# Patient Record
Sex: MALE | Race: WHITE | NOT HISPANIC OR LATINO | Employment: OTHER | URBAN - METROPOLITAN AREA
[De-identification: names, ages, dates, MRNs, and addresses within clinical notes are randomized per-mention and may not be internally consistent; named-entity substitution may affect disease eponyms.]

---

## 2020-10-17 ENCOUNTER — HOSPITAL ENCOUNTER (INPATIENT)
Facility: HOSPITAL | Age: 85
LOS: 2 days | Discharge: HOME HEALTH | DRG: 087 | End: 2020-10-19
Attending: EMERGENCY MEDICINE | Admitting: ANESTHESIOLOGY
Payer: MEDICARE

## 2020-10-17 DIAGNOSIS — I60.9 SUBARACHNOID HEMORRHAGE: ICD-10-CM

## 2020-10-17 DIAGNOSIS — S06.6X0A SUBARACHNOID HEMORRHAGE FOLLOWING INJURY, NO LOSS OF CONSCIOUSNESS, INITIAL ENCOUNTER: ICD-10-CM

## 2020-10-17 DIAGNOSIS — S06.6XAA: Primary | ICD-10-CM

## 2020-10-17 DIAGNOSIS — S09.93XA: ICD-10-CM

## 2020-10-17 DIAGNOSIS — I25.10 CORONARY ARTERY DISEASE INVOLVING NATIVE CORONARY ARTERY OF NATIVE HEART WITHOUT ANGINA PECTORIS: ICD-10-CM

## 2020-10-17 DIAGNOSIS — I10 ESSENTIAL HYPERTENSION: ICD-10-CM

## 2020-10-17 DIAGNOSIS — E11.9 TYPE 2 DIABETES MELLITUS WITHOUT COMPLICATION, WITHOUT LONG-TERM CURRENT USE OF INSULIN: ICD-10-CM
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
## 2020-10-17 LAB
ABO + RH BLD: NORMAL
ALBUMIN SERPL BCP-MCNC: 3.8 G/DL (ref 3.5–5.2)
ALP SERPL-CCNC: 183 U/L (ref 55–135)
ALT SERPL W/O P-5'-P-CCNC: 21 U/L (ref 10–44)
ANION GAP SERPL CALC-SCNC: 10 MMOL/L (ref 8–16)
AST SERPL-CCNC: 28 U/L (ref 10–40)
BASOPHILS # BLD AUTO: 0.03 K/UL (ref 0–0.2)
BASOPHILS NFR BLD: 0.3 % (ref 0–1.9)
BILIRUB SERPL-MCNC: 1.1 MG/DL (ref 0.1–1)
BILIRUB UR QL STRIP: NEGATIVE
BLD GP AB SCN CELLS X3 SERPL QL: NORMAL
BLD GP AB SCN CELLS X3 SERPL QL: NORMAL
BUN SERPL-MCNC: 24 MG/DL (ref 8–23)
CALCIUM SERPL-MCNC: 8.9 MG/DL (ref 8.7–10.5)
CHLORIDE SERPL-SCNC: 103 MMOL/L (ref 95–110)
CLARITY UR REFRACT.AUTO: CLEAR
CO2 SERPL-SCNC: 24 MMOL/L (ref 23–29)
COLOR UR AUTO: YELLOW
CREAT SERPL-MCNC: 1.1 MG/DL (ref 0.5–1.4)
CTP QC/QA: YES
DIFFERENTIAL METHOD: ABNORMAL
EOSINOPHIL # BLD AUTO: 0.1 K/UL (ref 0–0.5)
EOSINOPHIL NFR BLD: 0.7 % (ref 0–8)
ERYTHROCYTE [DISTWIDTH] IN BLOOD BY AUTOMATED COUNT: 17.2 % (ref 11.5–14.5)
EST. GFR  (AFRICAN AMERICAN): >60 ML/MIN/1.73 M^2
EST. GFR  (NON AFRICAN AMERICAN): 59.2 ML/MIN/1.73 M^2
GLUCOSE SERPL-MCNC: 192 MG/DL (ref 70–110)
GLUCOSE SERPL-MCNC: 220 MG/DL (ref 70–110)
GLUCOSE UR QL STRIP: NEGATIVE
HCT VFR BLD AUTO: 31.2 % (ref 40–54)
HGB BLD-MCNC: 10.1 G/DL (ref 14–18)
HGB UR QL STRIP: NEGATIVE
IMM GRANULOCYTES # BLD AUTO: 0.03 K/UL (ref 0–0.04)
IMM GRANULOCYTES NFR BLD AUTO: 0.3 % (ref 0–0.5)
INR PPP: 1 (ref 0.8–1.2)
KETONES UR QL STRIP: ABNORMAL
LEUKOCYTE ESTERASE UR QL STRIP: NEGATIVE
LYMPHOCYTES # BLD AUTO: 0.5 K/UL (ref 1–4.8)
LYMPHOCYTES NFR BLD: 5.6 % (ref 18–48)
MCH RBC QN AUTO: 32.1 PG (ref 27–31)
MCHC RBC AUTO-ENTMCNC: 32.4 G/DL (ref 32–36)
MCV RBC AUTO: 99 FL (ref 82–98)
MICROSCOPIC COMMENT: NORMAL
MONOCYTES # BLD AUTO: 0.9 K/UL (ref 0.3–1)
MONOCYTES NFR BLD: 10.1 % (ref 4–15)
NEUTROPHILS # BLD AUTO: 7.6 K/UL (ref 1.8–7.7)
NEUTROPHILS NFR BLD: 83 % (ref 38–73)
NITRITE UR QL STRIP: NEGATIVE
NRBC BLD-RTO: 0 /100 WBC
PH UR STRIP: 5 [PH] (ref 5–8)
PLATELET # BLD AUTO: 123 K/UL (ref 150–350)
PMV BLD AUTO: 9.3 FL (ref 9.2–12.9)
POCT GLUCOSE: 192 MG/DL (ref 70–110)
POTASSIUM SERPL-SCNC: 3.9 MMOL/L (ref 3.5–5.1)
PROT SERPL-MCNC: 6.8 G/DL (ref 6–8.4)
PROT UR QL STRIP: NEGATIVE
PROTHROMBIN TIME: 11.4 SEC (ref 9–12.5)
RBC # BLD AUTO: 3.15 M/UL (ref 4.6–6.2)
RBC #/AREA URNS AUTO: 1 /HPF (ref 0–4)
SARS-COV-2 RDRP RESP QL NAA+PROBE: NEGATIVE
SODIUM SERPL-SCNC: 137 MMOL/L (ref 136–145)
SP GR UR STRIP: 1.02 (ref 1–1.03)
SQUAMOUS #/AREA URNS AUTO: 0 /HPF
URN SPEC COLLECT METH UR: ABNORMAL
WBC # BLD AUTO: 9.11 K/UL (ref 3.9–12.7)
WBC #/AREA URNS AUTO: 0 /HPF (ref 0–5)

## 2020-10-17 PROCEDURE — 86850 RBC ANTIBODY SCREEN: CPT

## 2020-10-17 PROCEDURE — 63600175 PHARM REV CODE 636 W HCPCS: Performed by: NURSE PRACTITIONER

## 2020-10-17 PROCEDURE — 94761 N-INVAS EAR/PLS OXIMETRY MLT: CPT

## 2020-10-17 PROCEDURE — 97535 SELF CARE MNGMENT TRAINING: CPT

## 2020-10-17 PROCEDURE — 80053 COMPREHEN METABOLIC PANEL: CPT

## 2020-10-17 PROCEDURE — U0002 COVID-19 LAB TEST NON-CDC: HCPCS | Performed by: EMERGENCY MEDICINE

## 2020-10-17 PROCEDURE — 97530 THERAPEUTIC ACTIVITIES: CPT

## 2020-10-17 PROCEDURE — 99285 PR EMERGENCY DEPT VISIT,LEVEL V: ICD-10-PCS | Mod: CS,,, | Performed by: EMERGENCY MEDICINE

## 2020-10-17 PROCEDURE — 85610 PROTHROMBIN TIME: CPT

## 2020-10-17 PROCEDURE — 97162 PT EVAL MOD COMPLEX 30 MIN: CPT

## 2020-10-17 PROCEDURE — 20000000 HC ICU ROOM

## 2020-10-17 PROCEDURE — 92610 EVALUATE SWALLOWING FUNCTION: CPT

## 2020-10-17 PROCEDURE — 27000221 HC OXYGEN, UP TO 24 HOURS

## 2020-10-17 PROCEDURE — 90715 TDAP VACCINE 7 YRS/> IM: CPT | Performed by: EMERGENCY MEDICINE

## 2020-10-17 PROCEDURE — 99223 1ST HOSP IP/OBS HIGH 75: CPT | Mod: ,,, | Performed by: PHYSICIAN ASSISTANT

## 2020-10-17 PROCEDURE — 25000003 PHARM REV CODE 250: Performed by: NURSE PRACTITIONER

## 2020-10-17 PROCEDURE — 85025 COMPLETE CBC W/AUTO DIFF WBC: CPT

## 2020-10-17 PROCEDURE — 63600175 PHARM REV CODE 636 W HCPCS: Performed by: EMERGENCY MEDICINE

## 2020-10-17 PROCEDURE — 82962 GLUCOSE BLOOD TEST: CPT | Mod: 59

## 2020-10-17 PROCEDURE — 97166 OT EVAL MOD COMPLEX 45 MIN: CPT

## 2020-10-17 PROCEDURE — 86850 RBC ANTIBODY SCREEN: CPT | Mod: 91

## 2020-10-17 PROCEDURE — 99285 EMERGENCY DEPT VISIT HI MDM: CPT | Mod: 25

## 2020-10-17 PROCEDURE — 96374 THER/PROPH/DIAG INJ IV PUSH: CPT

## 2020-10-17 PROCEDURE — 81001 URINALYSIS AUTO W/SCOPE: CPT

## 2020-10-17 PROCEDURE — 90471 IMMUNIZATION ADMIN: CPT | Performed by: EMERGENCY MEDICINE

## 2020-10-17 PROCEDURE — 97802 MEDICAL NUTRITION INDIV IN: CPT

## 2020-10-17 PROCEDURE — 99285 EMERGENCY DEPT VISIT HI MDM: CPT | Mod: CS,,, | Performed by: EMERGENCY MEDICINE

## 2020-10-17 PROCEDURE — 63600175 PHARM REV CODE 636 W HCPCS: Performed by: PHYSICIAN ASSISTANT

## 2020-10-17 PROCEDURE — 99223 PR INITIAL HOSPITAL CARE,LEVL III: ICD-10-PCS | Mod: ,,, | Performed by: PHYSICIAN ASSISTANT

## 2020-10-17 RX ORDER — SODIUM CHLORIDE 0.9 % (FLUSH) 0.9 %
10 SYRINGE (ML) INJECTION
Status: DISCONTINUED | OUTPATIENT
Start: 2020-10-17 | End: 2020-10-19 | Stop reason: HOSPADM

## 2020-10-17 RX ORDER — POTASSIUM CHLORIDE 7.45 MG/ML
60 INJECTION INTRAVENOUS
Status: DISCONTINUED | OUTPATIENT
Start: 2020-10-17 | End: 2020-10-18

## 2020-10-17 RX ORDER — BLOOD SUGAR DIAGNOSTIC
STRIP MISCELLANEOUS
COMMUNITY
Start: 2020-03-12

## 2020-10-17 RX ORDER — INSULIN ASPART 100 [IU]/ML
1-10 INJECTION, SOLUTION INTRAVENOUS; SUBCUTANEOUS EVERY 6 HOURS PRN
Status: DISCONTINUED | OUTPATIENT
Start: 2020-10-17 | End: 2020-10-19 | Stop reason: HOSPADM

## 2020-10-17 RX ORDER — LABETALOL HCL 20 MG/4 ML
10 SYRINGE (ML) INTRAVENOUS EVERY 6 HOURS PRN
Status: DISCONTINUED | OUTPATIENT
Start: 2020-10-17 | End: 2020-10-18

## 2020-10-17 RX ORDER — ATORVASTATIN CALCIUM 40 MG/1
40 TABLET, FILM COATED ORAL
COMMUNITY

## 2020-10-17 RX ORDER — LEVETIRACETAM 5 MG/ML
500 INJECTION INTRAVASCULAR EVERY 12 HOURS
Status: DISCONTINUED | OUTPATIENT
Start: 2020-10-17 | End: 2020-10-17

## 2020-10-17 RX ORDER — POTASSIUM CHLORIDE 7.45 MG/ML
80 INJECTION INTRAVENOUS
Status: DISCONTINUED | OUTPATIENT
Start: 2020-10-17 | End: 2020-10-18

## 2020-10-17 RX ORDER — CALCIUM CARBONATE/VITAMIN D3 250-3.125
1 TABLET ORAL
COMMUNITY

## 2020-10-17 RX ORDER — CLOPIDOGREL BISULFATE 75 MG/1
75 TABLET ORAL
COMMUNITY
Start: 2020-02-24 | End: 2021-02-23

## 2020-10-17 RX ORDER — LANOLIN ALCOHOL/MO/W.PET/CERES
400 CREAM (GRAM) TOPICAL
COMMUNITY

## 2020-10-17 RX ORDER — ONDANSETRON 2 MG/ML
4 INJECTION INTRAMUSCULAR; INTRAVENOUS EVERY 6 HOURS PRN
Status: DISCONTINUED | OUTPATIENT
Start: 2020-10-17 | End: 2020-10-19 | Stop reason: HOSPADM

## 2020-10-17 RX ORDER — METFORMIN HYDROCHLORIDE 500 MG/1
500 TABLET ORAL
COMMUNITY
Start: 2020-05-04

## 2020-10-17 RX ORDER — GLUCAGON 1 MG
1 KIT INJECTION
Status: DISCONTINUED | OUTPATIENT
Start: 2020-10-17 | End: 2020-10-19 | Stop reason: HOSPADM

## 2020-10-17 RX ORDER — POTASSIUM CHLORIDE 20 MEQ/1
20 TABLET, EXTENDED RELEASE ORAL
COMMUNITY
Start: 2020-10-01

## 2020-10-17 RX ORDER — DEXTROSE 4 G
TABLET,CHEWABLE ORAL
COMMUNITY
Start: 2020-01-24

## 2020-10-17 RX ORDER — MAGNESIUM SULFATE HEPTAHYDRATE 40 MG/ML
4 INJECTION, SOLUTION INTRAVENOUS
Status: DISCONTINUED | OUTPATIENT
Start: 2020-10-17 | End: 2020-10-18

## 2020-10-17 RX ORDER — MAGNESIUM SULFATE HEPTAHYDRATE 40 MG/ML
2 INJECTION, SOLUTION INTRAVENOUS
Status: DISCONTINUED | OUTPATIENT
Start: 2020-10-17 | End: 2020-10-18

## 2020-10-17 RX ORDER — LANCETS
EACH MISCELLANEOUS
COMMUNITY
Start: 2020-03-13

## 2020-10-17 RX ORDER — CARVEDILOL 3.12 MG/1
3.12 TABLET ORAL
COMMUNITY
Start: 2020-07-02

## 2020-10-17 RX ORDER — POTASSIUM CHLORIDE 7.45 MG/ML
40 INJECTION INTRAVENOUS
Status: DISCONTINUED | OUTPATIENT
Start: 2020-10-17 | End: 2020-10-18

## 2020-10-17 RX ORDER — IBUPROFEN 100 MG/5ML
1000 SUSPENSION, ORAL (FINAL DOSE FORM) ORAL
COMMUNITY

## 2020-10-17 RX ORDER — VITAMIN E 268 MG
400 CAPSULE ORAL
COMMUNITY

## 2020-10-17 RX ORDER — MORPHINE SULFATE 4 MG/ML
4 INJECTION, SOLUTION INTRAMUSCULAR; INTRAVENOUS
Status: COMPLETED | OUTPATIENT
Start: 2020-10-17 | End: 2020-10-17

## 2020-10-17 RX ORDER — AMOXICILLIN 250 MG
1 CAPSULE ORAL 2 TIMES DAILY
Status: DISCONTINUED | OUTPATIENT
Start: 2020-10-17 | End: 2020-10-19 | Stop reason: HOSPADM

## 2020-10-17 RX ORDER — LEVETIRACETAM 500 MG/1
500 TABLET ORAL 2 TIMES DAILY
Status: DISCONTINUED | OUTPATIENT
Start: 2020-10-17 | End: 2020-10-19 | Stop reason: HOSPADM

## 2020-10-17 RX ORDER — ACETAMINOPHEN 325 MG/1
650 TABLET ORAL EVERY 6 HOURS PRN
Status: DISCONTINUED | OUTPATIENT
Start: 2020-10-17 | End: 2020-10-19 | Stop reason: HOSPADM

## 2020-10-17 RX ORDER — LOSARTAN POTASSIUM 25 MG/1
TABLET ORAL
COMMUNITY
Start: 2020-07-02

## 2020-10-17 RX ORDER — FUROSEMIDE 40 MG/1
40 TABLET ORAL
COMMUNITY
Start: 2020-04-15

## 2020-10-17 RX ADMIN — LEVETIRACETAM 500 MG: 500 TABLET ORAL at 08:10

## 2020-10-17 RX ADMIN — DOCUSATE SODIUM 50MG AND SENNOSIDES 8.6MG 1 TABLET: 8.6; 5 TABLET, FILM COATED ORAL at 09:10

## 2020-10-17 RX ADMIN — ACETAMINOPHEN 650 MG: 325 TABLET ORAL at 11:10

## 2020-10-17 RX ADMIN — LEVETIRACETAM INJECTION 500 MG: 5 INJECTION INTRAVENOUS at 05:10

## 2020-10-17 RX ADMIN — MORPHINE SULFATE 4 MG: 4 INJECTION INTRAVENOUS at 02:10

## 2020-10-17 RX ADMIN — INSULIN ASPART 2 UNITS: 100 INJECTION, SOLUTION INTRAVENOUS; SUBCUTANEOUS at 12:10

## 2020-10-17 RX ADMIN — CLOSTRIDIUM TETANI TOXOID ANTIGEN (FORMALDEHYDE INACTIVATED), CORYNEBACTERIUM DIPHTHERIAE TOXOID ANTIGEN (FORMALDEHYDE INACTIVATED), BORDETELLA PERTUSSIS TOXOID ANTIGEN (GLUTARALDEHYDE INACTIVATED), BORDETELLA PERTUSSIS FILAMENTOUS HEMAGGLUTININ ANTIGEN (FORMALDEHYDE INACTIVATED), BORDETELLA PERTUSSIS PERTACTIN ANTIGEN, AND BORDETELLA PERTUSSIS FIMBRIAE 2/3 ANTIGEN 0.5 ML: 5; 2; 2.5; 5; 3; 5 INJECTION, SUSPENSION INTRAMUSCULAR at 02:10

## 2020-10-17 RX ADMIN — DOCUSATE SODIUM 50MG AND SENNOSIDES 8.6MG 1 TABLET: 8.6; 5 TABLET, FILM COATED ORAL at 08:10

## 2020-10-17 RX ADMIN — INSULIN ASPART 2 UNITS: 100 INJECTION, SOLUTION INTRAVENOUS; SUBCUTANEOUS at 05:10

## 2020-10-17 RX ADMIN — INSULIN ASPART 4 UNITS: 100 INJECTION, SOLUTION INTRAVENOUS; SUBCUTANEOUS at 06:10

## 2020-10-17 NOTE — ASSESSMENT & PLAN NOTE
Traumatic SAH after mechanical fall  -NSGY following   -Q 1 hour neuro checks   -Q 1 hour vital signs   -SBP goal <160  -6 hour repeat CTH stable   -hold DAPT in setting of SAH   -neuro exam stable   -keppra 500 mg BID x 7 days

## 2020-10-17 NOTE — ED PROVIDER NOTES
"Source of History:  Patient, chart    Chief complaint:  [place] Transfer (Traumatic SAH from mechanical fall. +Plavix. GCS 15)    HPI:  Jayesh Astudillo is a 89 y.o. male with history of diabetes, hypertension, hyperlipidemia, on aspirin and Plavix presenting as transfer from outside hospital for traumatic subarachnoid.  Patient had mechanical fall earlier this evening, struck his right forehead/face and then developed pain in that area.  He denies loss of consciousness.  He is compliant with his medications.  No neck pain.  No headache.  No eye pain.  He is able to move all extremities and his speech is unchanged.    At outside hospital he was found to have likely subarachnoid hemorrhage.  He was hypertensive at outside hospital, here blood pressures in the 140s.  Per my review of the chart, he was not given any medications at outside hospital.    He denies any complaints at this time.    ROS: As per HPI and below:  Review of Systems   Constitutional: Negative for fever.   HENT: Negative for sore throat.    Eyes: Negative for double vision.   Respiratory: Negative for cough and shortness of breath.    Cardiovascular: Negative for chest pain.   Gastrointestinal: Negative for abdominal pain and vomiting.   Genitourinary: Negative for dysuria.   Musculoskeletal: Positive for falls.   Skin: Negative for rash.   Neurological: Negative for loss of consciousness and headaches.       Review of patient's allergies indicates:   Allergen Reactions    Iohexol Other (See Comments)     Pt reprts possible reaction to "the cath dye". Pt reports "started shaking".        No current facility-administered medications on file prior to encounter.      Current Outpatient Medications on File Prior to Encounter   Medication Sig Dispense Refill    blood sugar diagnostic (ACCU-CHEK AURE PLUS TEST STRP) Strp USE 1 STRIP TO CHECK GLUCOSE ONCE DAILY FOR 90 DAYS      blood-glucose meter (ACCU-CHEK AURE PLUS METER) Misc USE AS DIRECTED FOR " 90 DAYS      carvediloL (COREG) 3.125 MG tablet Take 3.125 mg by mouth.      clopidogreL (PLAVIX) 75 mg tablet Take 75 mg by mouth.      furosemide (LASIX) 40 MG tablet Take 40 mg by mouth.      lancets (ACCU-CHEK SOFTCLIX LANCETS) Misc USE AS DIRECTED ONCE DAILY FOR 30 DAYS      losartan (COZAAR) 25 MG tablet TAKE 1 TABLET EVERY DAY      metFORMIN (GLUCOPHAGE) 500 MG tablet Take 500 mg by mouth.      potassium chloride SA (K-DUR,KLOR-CON) 20 MEQ tablet Take 20 mEq by mouth.      ascorbic acid, vitamin C, (VITAMIN C) 1000 MG tablet Take 1,000 mg by mouth.      atorvastatin (LIPITOR) 40 MG tablet Take 40 mg by mouth.      calcium carbonate-vitamin D3 250-125 mg 250-125 mg-unit Tab Take 1 tablet by mouth.      magnesium oxide (MAG-OX) 400 mg (241.3 mg magnesium) tablet Take 400 mg by mouth.      vitamin E 400 UNIT capsule Take 400 Units by mouth.         PMH:  As per HPI and below:  Past Medical History:   Diagnosis Date    Arrhythmia     Asthma     Atrial fibrillation     Chronic kidney disease     Coronary artery disease     Diabetes mellitus     Hyperlipidemia     Hypertension     Nocturia     Sick sinus syndrome     Tachy-alton syndrome      Past Surgical History:   Procedure Laterality Date    ANKLE SURGERY      CARDIAC PACEMAKER PLACEMENT      CHOLECYSTECTOMY      COLONOSCOPY      CORONARY ANGIOPLASTY WITH STENT PLACEMENT      CORONARY ARTERY BYPASS GRAFT      JOINT REPLACEMENT      TONSILLECTOMY         Social History     Socioeconomic History    Marital status:      Spouse name: Not on file    Number of children: Not on file    Years of education: Not on file    Highest education level: Not on file   Occupational History    Not on file   Social Needs    Financial resource strain: Not on file    Food insecurity     Worry: Not on file     Inability: Not on file    Transportation needs     Medical: Not on file     Non-medical: Not on file   Tobacco Use    Smoking  status: Former Smoker    Smokeless tobacco: Never Used   Substance and Sexual Activity    Alcohol use: Yes     Comment: couple times a week    Drug use: Never    Sexual activity: Not on file   Lifestyle    Physical activity     Days per week: Not on file     Minutes per session: Not on file    Stress: Not on file   Relationships    Social connections     Talks on phone: Not on file     Gets together: Not on file     Attends Yazidi service: Not on file     Active member of club or organization: Not on file     Attends meetings of clubs or organizations: Not on file     Relationship status: Not on file   Other Topics Concern    Not on file   Social History Narrative    Not on file       History reviewed. No pertinent family history.    Physical Exam:      Vitals:    10/17/20 0430   BP: 123/61   Pulse: 70   Resp: 16   Temp:      Gen: No acute distress.  Mental Status:  Alert and oriented x 3.  Appropriate, conversant.  Skin: Warm, dry. No rashes seen.  Ecchymosis and soft tissue swelling of the right periorbital region.  Eyes:  JUAN.  EOMI.  No conjunctival injection.  Vision grossly intact.  Pulm: No increased work of breathing.  No significant tachypnea.  No audible stridor or wheezing.  No conversational dyspnea.  Auscultation limited secondary to PPE.  CV: Regular rate. Regular rhythm. Normal peripheral perfusion. No lower extremity edema.  Abd: Soft.  Not distended.  Nontender.   MSK: Good range of motion all joints.  No deformities.    Neuro: Awake. Speech normal. No focal neuro deficit observed.    Laboratory Studies:  Labs Reviewed   CBC W/ AUTO DIFFERENTIAL - Abnormal; Notable for the following components:       Result Value    RBC 3.15 (*)     Hemoglobin 10.1 (*)     Hematocrit 31.2 (*)     Mean Corpuscular Volume 99 (*)     Mean Corpuscular Hemoglobin 32.1 (*)     RDW 17.2 (*)     Platelets 123 (*)     Lymph # 0.5 (*)     Gran% 83.0 (*)     Lymph% 5.6 (*)     All other components within normal  limits   COMPREHENSIVE METABOLIC PANEL - Abnormal; Notable for the following components:    Glucose 220 (*)     BUN, Bld 24 (*)     Total Bilirubin 1.1 (*)     Alkaline Phosphatase 183 (*)     eGFR if non  59.2 (*)     All other components within normal limits   PROTIME-INR   SARS-COV-2 RDRP GENE    Narrative:     This test utilizes isothermal nucleic acid amplification   technology to detect the SARS-CoV-2 RdRp nucleic acid segment.   The analytical sensitivity (limit of detection) is 125 genome   equivalents/mL.   A POSITIVE result implies infection with the SARS-CoV-2 virus;   the patient is presumed to be contagious.     A NEGATIVE result means that SARS-CoV-2 nucleic acids are not   present above the limit of detection. A NEGATIVE result should be   treated as presumptive. It does not rule out the possibility of   COVID-19 and should not be the sole basis for treatment decisions.   If COVID-19 is strongly suspected based on clinical and exposure   history, re-testing using an alternate molecular assay should be   considered.   This test is only for use under the Food and Drug   Administration s Emergency Use Authorization (EUA).   Commercial kits are provided by [employer].   Performance characteristics of the EUA have been independently   verified by [facility] Department of   Pathology and Laboratory Medicine.   _________________________________________________________________   The authorized Fact Sheet for Healthcare Providers and the authorized Fact   Sheet for Patients of the ID NOW COVID-19 are available on the FDA   website:     https://www.fda.gov/media/878342/download  https://www.fda.gov/media/147848/download           Chart reviewed.     Imaging Results          CT Head Without Contrast (In process)                CT Maxillofacial Without Contrast (Final result)  Result time 10/17/20 03:20:13    Final result by Braulio Beck MD (10/17/20 03:20:13)                  Impression:      Large right frontal scalp and periorbital scalp hematoma/soft tissue contusion.  No displaced facial fracture.    Reported subarachnoid hemorrhage identified on outside hospital CT is not identified within the field of view on the current study.    Chronic appearing paranasal sinus disease with complete opacification of the right frontal and maxillary sinuses.    Additional incidental findings discussed in the body of the report.      Electronically signed by: Braulio Beck MD  Date:    10/17/2020  Time:    03:20             Narrative:    EXAMINATION:  CT MAXILLOFACIAL WITHOUT CONTRAST    CLINICAL HISTORY:  Facial trauma; Transfer from outside hospital for subarachnoid hemorrhage.    TECHNIQUE:  Low dose axial images, sagittal and coronal reformations were obtained through the face.  Contrast was not administered.    COMPARISON:  None.    FINDINGS:  Right frontal scalp soft tissue contusion with prominent hematoma in the frontal scalp soft tissues measuring approximately 3 x 2 cm in axial dimensions.  Moderate degree of periorbital soft tissue edema extending along the right zygomatic region.  Globes are symmetric.  Retrobulbar fat is preserved.  There are postoperative changes of bilateral cataract surgery.  Small linear hyperdensity overlying the anterior aspect of the left globe likely reflects postoperative changes.  Extraocular muscles are unremarkable.    No acute facial fracture is identified.    Essentially complete opacification of the right frontal and right maxillary sinus.  Thinning of the medial wall of the left maxillary sinus could be related to aforementioned sinus disease or postoperative changes.  There is also slight thinning of the right medial orbital wall when compared to the left side.  Patchy mucosal thickening in the ethmoid air cells, right side greater than left.  Left maxillary and sphenoid sinuses are essentially clear.  The visualized mastoid air cells are  essentially clear.    Advanced degenerative changes in the visualized cervical spine.  Slight anterolisthesis of C2 with respect to C3 and C3 with respect to C4.    Scattered dental caries and periapical lucencies suggestive of periodontal disease.    Limited intracranial evaluation is negative for acute finding.  Generalized cerebral volume loss with ex vacuo dilation of the ventricles and sulci.  Reported subarachnoid hemorrhage identified on outside hospital CT is not identified within the field of view on the current study.                                Medications Given:  Medications   Tdap vaccine injection 0.5 mL (0.5 mLs Intramuscular Given 10/17/20 0206)   morphine injection 4 mg (4 mg Intravenous Given 10/17/20 9111)       Discussed with: KYAW, neuro ICU    MDM:    89 y.o. male transferred from outside hospital for subarachnoid hemorrhage on Plavix.  He is afebrile, not hypertensive, and well appearing.  He is neuro intact.     I obtained a CT of the max/face, which does not show any acute abnormality.  He had a laceration repaired at outside hospital.  No cervical spine tenderness, no evidence of additional trauma.    Case discussed with neuro surgery who recommended admission to neuro ICU.    Diagnostic Impression:    1. Subarachnoid hemorrhage         ED Disposition Condition    Admit              Patient and/or family understands the plan and is in agreement, verbalized understanding, questions answered    Allison Aparicio MD  Emergency Medicine       Allison Aparicio MD  10/17/20 2545

## 2020-10-17 NOTE — HPI
Pt is an 89 y.o. male with PMHx of DM, HTN, CAD with stents on ASA and plavix who presents as a transfer from OSH for traumatic SAH. Patient was at home last night when he had a mechanical fall after turning too fast on his brick floors. He hit his head on a ledge while falling. He denies losing consciousness. He went to an OSH where CTH revealed small traumatic SAH and CT maxillofacial negative for facial fractures. [facility] was [place] [facility] [place]. Per NSGY request patient is admitted to [facility] for Q 1 hour neuro checks and higher level care.

## 2020-10-17 NOTE — CONSULTS
"[facility]  Neurosurgery  Consult Note    Consults  Subjective:     Chief Complaint/Reason for Admission:  tSAH   History of Present Illness: 89 y/o M  on ASA/plavix with pacemaker and cardiac stents s/p fall from standing with left frontal tSAH transferred from OSH  GCS 15 on initial presentation.  He denies use of blood thinners other than asa/plavix.  He denies LOC.     (Not in a hospital admission)      Review of patient's allergies indicates:   Allergen Reactions    Iohexol Other (See Comments)     Pt reprts possible reaction to "the cath dye". Pt reports "started shaking".        Past Medical History:   Diagnosis Date    Arrhythmia     Asthma     Atrial fibrillation     Chronic kidney disease     Coronary artery disease     Diabetes mellitus     Hyperlipidemia     Hypertension     Nocturia     Sick sinus syndrome     Tachy-alton syndrome      Past Surgical History:   Procedure Laterality Date    ANKLE SURGERY      CARDIAC PACEMAKER PLACEMENT      CHOLECYSTECTOMY      COLONOSCOPY      CORONARY ANGIOPLASTY WITH STENT PLACEMENT      CORONARY ARTERY BYPASS GRAFT      JOINT REPLACEMENT      TONSILLECTOMY       Family History     None        Tobacco Use    Smoking status: Former Smoker    Smokeless tobacco: Never Used   Substance and Sexual Activity    Alcohol use: Yes     Comment: couple times a week    Drug use: Never    Sexual activity: Not on file     Review of Systems   Constitutional: Negative for activity change.   HENT: Negative for congestion.    Neurological: Negative for weakness.   Psychiatric/Behavioral: Negative for behavioral problems.     Objective:     Weight: 83.9 kg (185 lb)  Body mass index is 25.8 kg/m².  Vital Signs (Most Recent):  Temp: 98.1 °F (36.7 °C) (10/17/20 0400)  Pulse: 70 (10/17/20 0430)  Resp: 16 (10/17/20 0430)  BP: 123/61 (10/17/20 0430)  SpO2: 100 % (10/17/20 0430) Vital Signs (24h Range):  Temp:  [98 °F (36.7 °C)-98.3 °F (36.8 °C)] " 98.1 °F (36.7 °C)  Pulse:  [69-70] 70  Resp:  [16-18] 16  SpO2:  [90 %-100 %] 100 %  BP: (114-163)/(55-79) 123/61                          Neurosurgery Physical Exam     gcs 15 aox3 maew   Right eye swollen but able to open it. PERRL no drift. No meningismus.     Significant Labs:  Recent Labs   Lab 10/17/20  0211   *      K 3.9      CO2 24   BUN 24*   CREATININE 1.1   CALCIUM 8.9     Recent Labs   Lab 10/17/20  0211   WBC 9.11   HGB 10.1*   HCT 31.2*   *     Recent Labs   Lab 10/17/20  0211   INR 1.0     Microbiology Results (last 7 days)     ** No results found for the last 168 hours. **        All pertinent labs from the last 24 hours have been reviewed.    Significant Diagnostics:  I have reviewed and interpreted all pertinent imaging results/findings within the past 24 hours.  Frontal tSAH on CTH from OSH      Assessment/Plan:     * Hemorrhage, subarachnoid, traumatic  88 y/o M on ASA/plavix with stent and pace seth presented after a fall found to have frontal tSAH presenting GCS 15    icu q1 hr enuro checks and vitals.   Stop asa and plavix.   keppra 500 BID  7 days    No platlets given. Awaiting stability scan about to occur.     No acute NSGY intervention.         Thank you for your consult.   Jsoh Colbert MD  Neurosurgery  [facility]

## 2020-10-17 NOTE — PLAN OF CARE
Recommendations    Recommendation:  1. Continue DM diet encourage good PO intake at meals   2. Add boost glucose if PO <50% of meals   3. RD to monitor and folllow up    Goals: pt to tolerate >85% of EEN/EPN by RD follow up  Nutrition Goal Status: new  Communication of RD Recs: other (comment)(POC)

## 2020-10-17 NOTE — CONSULTS
"  [facility]  Adult Nutrition  Consult Note    SUMMARY     Recommendations    Recommendation:  1. Continue DM diet encourage good PO intake at meals   2. Add boost glucose if PO <50% of meals   3. RD to monitor and folllow up    Goals: pt to tolerate >85% of EEN/EPN by RD follow up  Nutrition Goal Status: new  Communication of RD Recs: other (comment)(POC)    Reason for Assessment    Reason For Assessment: consult  Diagnosis: (subarachnoid hemorrhage)  Relevant Medical History: DM; HTN; HLD; CAD; afib  Interdisciplinary Rounds: did not attend  General Information Comments: Pt resting in bed, bfst at bedside. States good PO intake PTA, 3xmeals/day. No recent wt loss reported, UBW ~180 lbs per pt. No c/o N/V/C/D at this time. NFPE not indicated, pt with no s/s of malnutrition. Will monitor.  Nutrition Discharge Planning: adequate PO intake    Nutrition Risk Screen    Nutrition Risk Screen: no indicators present    Nutrition/Diet History    Food Allergies: NKFA  Factors Affecting Nutritional Intake: None identified at this time    Anthropometrics    Temp: 99.1 °F (37.3 °C)  Height Method: Stated  Height: 5' 11" (180.3 cm)  Height (inches): 71 in  Weight Method: Stated  Weight: 83.9 kg (185 lb)  Weight (lb): 185 lb  Ideal Body Weight (IBW), Male: 172 lb  % Ideal Body Weight, Male (lb): 107.56 %  BMI (Calculated): 25.8  BMI Grade: 25 - 29.9 - overweight     Lab/Procedures/Meds    Pertinent Labs Reviewed: reviewed  Pertinent Labs Comments: Glucose 220; BUN 24  Pertinent Medications Reviewed: reviewed  Pertinent Medications Comments: senna-docusate     Estimated/Assessed Needs    Weight Used For Calorie Calculations: 83.9 kg (184 lb 15.5 oz)  Energy Calorie Requirements (kcal): 1831 kcal/d  Energy Need Method: [place](x1.2)  Protein Requirements:  g/day  Weight Used For Protein Calculations: 83.9 kg (184 lb 15.5 oz)  Fluid Requirements (mL): 1 mL/kcal or per MD  RDA Method (mL): 1831  CHO " Requirement: 228    Nutrition Prescription Ordered    Current Diet Order: 2000 kcal DM diet    Evaluation of Received Nutrient/Fluid Intake    Energy Calories Required: meeting needs  Protein Required: meeting needs  Fluid Required: meeting needs  Comments: LBM 10/16  Tolerance: tolerating  % Intake of Estimated Energy Needs: 25 - 50 %  % Meal Intake: 25 - 50 %    Nutrition Risk    Level of Risk/Frequency of Follow-up: high     Assessment and Plan  Nutrition Problem  inadequate energy intake    Related to (etiology):   Decreased appetite     Signs and Symptoms (as evidenced by):   PO <75% of meals     Interventions (treatment strategy):  Collaboration of care with other providers    Nutrition Diagnosis Status:   New    Monitor and Evaluation    Food and Nutrient Intake: energy intake, food and beverage intake  Food and Nutrient Adminstration: diet order  Knowledge/Beliefs/Attitudes: food and nutrition knowledge/skill  Anthropometric Measurements: weight, weight change  Biochemical Data, Medical Tests and Procedures: electrolyte and renal panel, gastrointestinal profile, glucose/endocrine profile, inflammatory profile, lipid profile  Nutrition-Focused Physical Findings: overall appearance     Nutrition Follow-Up    RD Follow-up?: Yes

## 2020-10-17 NOTE — PT/OT/SLP EVAL
Speech Language Pathology Evaluation  Bedside Swallow    Patient Name:  Jayesh Astudillo   MRN:  [id number]   [id number] A  Admitting Diagnosis: Hemorrhage, subarachnoid, traumatic    Recommendations:                 General Recommendations:  Speech language evaluation and Cognitive-linguistic evaluation  Diet recommendations:  Regular, Thin   Aspiration Precautions: Standard aspiration precautions   General Precautions: Standard,    Communication strategies:  none    History:     Past Medical History:   Diagnosis Date    Arrhythmia     Asthma     Atrial fibrillation     Chronic kidney disease     Coronary artery disease     Diabetes mellitus     Hyperlipidemia     Hypertension     Nocturia     Sick sinus syndrome     Tachy-alton syndrome        Past Surgical History:   Procedure Laterality Date    ANKLE SURGERY      CARDIAC PACEMAKER PLACEMENT      CHOLECYSTECTOMY      COLONOSCOPY      CORONARY ANGIOPLASTY WITH STENT PLACEMENT      CORONARY ARTERY BYPASS GRAFT      JOINT REPLACEMENT      TONSILLECTOMY       [facility] note 10/17: History of Present Illness: Pt is an 89 y.o. male with PMHx of DM, HTN, CAD with stents on ASA and plavix who presents as a transfer from OSH for traumatic SAH. Patient was at home last night when he had a mechanical fall after turning too fast on his brick floors. He hit his head on a ledge while falling. He denies losing consciousness. He went to an OSH where CTH revealed small traumatic SAH and CT maxillofacial negative for facial fractures. Patient was transferred to [facility] for neurosurgical evaluation. Per NSGY request patient is admitted to [facility] for Q 1 hour neuro checks and higher level care.      Head CT: Lobular hyperdensity overlying the left paramedian frontal convexity suggesting small volume of extra-axial hemorrhage.  Correlation with prior outside imaging to assess for stability advised.  Short-term follow-up could be performed to confirm stability.  Large right frontal  "scalp soft tissue hematoma with associated prominent periorbital soft tissue edema.  Chronic senescent and microvascular ischemic changes.    Chest X-Rays: none for current admission    Prior diet: reg/thin      Subjective     Communicated with RN prior to entry.   Patient awake and cooperative.   Patient states, I haven't eaten since about 12 yesterday."    Pain/Comfort:  · Pain Rating 1: 0/10    Objective:     Oral Musculature Evaluation  · Oral Musculature: WFL  · Mucosal Quality: adequate  · Mandibular Strength and Mobility: WFL  · Oral Labial Strength and Mobility: WFL  · Lingual Strength and Mobility: WFL  · Volitional Cough: elicited  · Volitional Swallow: timely  · Voice Prior to PO Intake: clear    Bedside Swallow Eval:   Consistencies Assessed:  · Thin liquids sips of water via cup and straw  · Puree bites of apple sauce (whole cup)  · Solids bites of mahin cracker     Oral Phase:   · WFL    Pharyngeal Phase:   · no overt clinical signs/symptoms of aspiration  · no overt clinical signs/symptoms of pharyngeal dysphagia    Compensatory Strategies  · None    Treatment: SLP provided patient education on SLP recommendations, SLP role, s/s and risks of aspiration, safe swallow precautions, and POC. Patient verbalized understanding of all discussed. White board updated. SLP communicated recommendations with RN.     Assessment:     Jayesh Astudillo is a 89 y.o. male with no overt s/s of aspiration or difficulties with all po trials. ST will continue to follow to complete a Ajstrb-Yqszsgps-Hbmsutnmu Evaluation.     Goals:   Multidisciplinary Problems     SLP Goals        Problem: SLP Goal    Goal Priority Disciplines Outcome   SLP Goal     SLP Ongoing, Progressing   Description: Speech Therapy Short Term Goals  Goal expected to be met by 10/31  1. Pt will tolerate a regular diet and thin liquids with no overt s/s of aspiration.   2. Pt will participate in a speech, language, and cognitive evaluation with possible " updated goals to follow pending results.                     Plan:     · Patient to be seen:  4 x/week   · Plan of Care expires:  11/15/20  · Plan of Care reviewed with:  patient   · SLP Follow-Up:  Yes       Discharge recommendations:  (pending slc eval)   Barriers to Discharge:  None per ST discipline.     Time Tracking:     SLP Treatment Date:   10/17/20  Speech Start Time:  0742  Speech Stop Time:  0749     Speech Total Time (min):  7 min    Billable Minutes: Eval Swallow and Oral Function 7    KATARINA Ramirez, CCC-SLP  10/17/2020

## 2020-10-17 NOTE — ED TRIAGE NOTES
Pt states that earlier yesterday evening he was walking at home lost his balance and fell and hit door frame. Pt denies LOC but right eye is swollen shut. Pt with SAH and transferred from [place] via [facility] for Neuro consult. Pt states he is on Plavix and ASA. Pt c/o right eye pain in area of swelling. Pt denies other c/o.

## 2020-10-17 NOTE — SUBJECTIVE & OBJECTIVE
Past Medical History:   Diagnosis Date    Arrhythmia     Asthma     Atrial fibrillation     Chronic kidney disease     Coronary artery disease     Diabetes mellitus     Hyperlipidemia     Hypertension     Nocturia     Sick sinus syndrome     Tachy-alton syndrome      Past Surgical History:   Procedure Laterality Date    ANKLE SURGERY      CARDIAC PACEMAKER PLACEMENT      CHOLECYSTECTOMY      COLONOSCOPY      CORONARY ANGIOPLASTY WITH STENT PLACEMENT      CORONARY ARTERY BYPASS GRAFT      JOINT REPLACEMENT      TONSILLECTOMY        No current facility-administered medications on file prior to encounter.      Current Outpatient Medications on File Prior to Encounter   Medication Sig Dispense Refill    blood sugar diagnostic (ACCU-CHEK AURE PLUS TEST STRP) Strp USE 1 STRIP TO CHECK GLUCOSE ONCE DAILY FOR 90 DAYS      blood-glucose meter (ACCU-CHEK AURE PLUS METER) Misc USE AS DIRECTED FOR 90 DAYS      carvediloL (COREG) 3.125 MG tablet Take 3.125 mg by mouth.      clopidogreL (PLAVIX) 75 mg tablet Take 75 mg by mouth.      furosemide (LASIX) 40 MG tablet Take 40 mg by mouth.      lancets (ACCU-CHEK SOFTCLIX LANCETS) Misc USE AS DIRECTED ONCE DAILY FOR 30 DAYS      losartan (COZAAR) 25 MG tablet TAKE 1 TABLET EVERY DAY      metFORMIN (GLUCOPHAGE) 500 MG tablet Take 500 mg by mouth.      potassium chloride SA (K-DUR,KLOR-CON) 20 MEQ tablet Take 20 mEq by mouth.      ascorbic acid, vitamin C, (VITAMIN C) 1000 MG tablet Take 1,000 mg by mouth.      atorvastatin (LIPITOR) 40 MG tablet Take 40 mg by mouth.      calcium carbonate-vitamin D3 250-125 mg 250-125 mg-unit Tab Take 1 tablet by mouth.      magnesium oxide (MAG-OX) 400 mg (241.3 mg magnesium) tablet Take 400 mg by mouth.      vitamin E 400 UNIT capsule Take 400 Units by mouth.        Allergies: Iohexol    History reviewed. No pertinent family history.  Social History     Tobacco Use    Smoking status: Former Smoker    Smokeless  tobacco: Never Used   Substance Use Topics    Alcohol use: Yes     Comment: couple times a week    Drug use: Never     Review of Systems   Constitutional: Negative for chills and fever.   HENT: Positive for facial swelling. Negative for hearing loss and voice change.    Eyes: Positive for pain. Negative for visual disturbance.   Respiratory: Negative for chest tightness and shortness of breath.    Cardiovascular: Negative for chest pain and leg swelling.   Gastrointestinal: Negative for abdominal pain, nausea and vomiting.   Musculoskeletal: Negative for back pain and neck pain.   Skin: Positive for wound. Negative for rash.   Neurological: Positive for headaches. Negative for dizziness, seizures, syncope, weakness and numbness.   Psychiatric/Behavioral: Negative for agitation, behavioral problems and confusion.     Objective:     Vitals:    Temp: 98.1 °F (36.7 °C)  Pulse: 69  BP: (!) 125/59  MAP (mmHg): 85  Resp: 15  SpO2: 100 %  O2 Device (Oxygen Therapy): nasal cannula    Temp  Min: 98 °F (36.7 °C)  Max: 98.3 °F (36.8 °C)  Pulse  Min: 69  Max: 71  BP  Min: 114/55  Max: 163/79  MAP (mmHg)  Min: 76  Max: 97  Resp  Min: 15  Max: 18  SpO2  Min: 90 %  Max: 100 %    No intake/output data recorded.           Physical Exam  Constitutional:       General: He is not in acute distress.     Appearance: Normal appearance. He is normal weight.   HENT:      Mouth/Throat:      Mouth: Mucous membranes are moist.   Eyes:      Pupils: Pupils are equal, round, and reactive to light.      Comments: R periorbital swelling and ecchymosis  Unable to assess R pupil  Vision intact to light stimulus in R eye   Cardiovascular:      Rate and Rhythm: Normal rate and regular rhythm.      Pulses: Normal pulses.   Pulmonary:      Effort: Pulmonary effort is normal. No respiratory distress.      Breath sounds: No wheezing.   Abdominal:      General: Abdomen is flat. There is no distension.      Palpations: Abdomen is soft.      Tenderness:  There is no abdominal tenderness.   Musculoskeletal:         General: No swelling, tenderness, deformity or signs of injury.   Skin:     General: Skin is warm and dry.      Comments: Laceration to R brow s/p 2 sutures   Neurological:      Mental Status: He is alert.      Comments: --GCS:  15  --Mental Status: AAO x 4  --CN II-XII grossly intact.   --left pupil 5 mm reactive, unable to assess Right 2/2 to periorbital swelling    --Motor: full strength throughout  --sensory: intact         Today I personally reviewed pertinent medications, lines/drains/airways, imaging, cardiology results, laboratory results, microbiology results, notably:  [facility]

## 2020-10-17 NOTE — H&P
[facility]  Neurocritical Care  History & Physical    Admit Date: 10/17/2020  Service Date: 10/17/2020  Length of Stay: 0    Subjective:     Chief Complaint: Hemorrhage, subarachnoid, traumatic    History of Present Illness: Pt is an 89 y.o. male with PMHx of DM, HTN, CAD with stents on ASA and plavix who presents as a transfer from OSH for traumatic SAH. Patient was at home last night when he had a mechanical fall after turning too fast on his brick floors. He hit his head on a ledge while falling. He denies losing consciousness. He went to an OSH where CTH revealed small traumatic SAH and CT maxillofacial negative for facial fractures. [place] [facility] [place]. Per NSGY request patient is admitted to [facility] for Q 1 hour neuro checks and higher level care.     Past Medical History:   Diagnosis Date    Arrhythmia     Asthma     Atrial fibrillation     Chronic kidney disease     Coronary artery disease     Diabetes mellitus     Hyperlipidemia     Hypertension     Nocturia     Sick sinus syndrome     Tachy-alton syndrome      Past Surgical History:   Procedure Laterality Date    ANKLE SURGERY      CARDIAC PACEMAKER PLACEMENT      CHOLECYSTECTOMY      COLONOSCOPY      CORONARY ANGIOPLASTY WITH STENT PLACEMENT      CORONARY ARTERY BYPASS GRAFT      JOINT REPLACEMENT      TONSILLECTOMY        No current facility-administered medications on file prior to encounter.      Current Outpatient Medications on File Prior to Encounter   Medication Sig Dispense Refill    blood sugar diagnostic (ACCU-CHEK AURE PLUS TEST STRP) Strp USE 1 STRIP TO CHECK GLUCOSE ONCE DAILY FOR 90 DAYS      blood-glucose meter (ACCU-CHEK AURE PLUS METER) Misc USE AS DIRECTED FOR 90 DAYS      carvediloL (COREG) 3.125 MG tablet Take 3.125 mg by mouth.      clopidogreL (PLAVIX) 75 mg tablet Take 75 mg by mouth.      furosemide (LASIX) 40 MG tablet Take 40 mg by mouth.       lancets (ACCU-CHEK SOFTCLIX LANCETS) Misc USE AS DIRECTED ONCE DAILY FOR 30 DAYS      losartan (COZAAR) 25 MG tablet TAKE 1 TABLET EVERY DAY      metFORMIN (GLUCOPHAGE) 500 MG tablet Take 500 mg by mouth.      potassium chloride SA (K-DUR,KLOR-CON) 20 MEQ tablet Take 20 mEq by mouth.      ascorbic acid, vitamin C, (VITAMIN C) 1000 MG tablet Take 1,000 mg by mouth.      atorvastatin (LIPITOR) 40 MG tablet Take 40 mg by mouth.      calcium carbonate-vitamin D3 250-125 mg 250-125 mg-unit Tab Take 1 tablet by mouth.      magnesium oxide (MAG-OX) 400 mg (241.3 mg magnesium) tablet Take 400 mg by mouth.      vitamin E 400 UNIT capsule Take 400 Units by mouth.        Allergies: Iohexol    History reviewed. No pertinent family history.  Social History     Tobacco Use    Smoking status: Former Smoker    Smokeless tobacco: Never Used   Substance Use Topics    Alcohol use: Yes     Comment: couple times a week    Drug use: Never     Review of Systems   Constitutional: Negative for chills and fever.   HENT: Positive for facial swelling. Negative for hearing loss and voice change.    Eyes: Positive for pain. Negative for visual disturbance.   Respiratory: Negative for chest tightness and shortness of breath.    Cardiovascular: Negative for chest pain and leg swelling.   Gastrointestinal: Negative for abdominal pain, nausea and vomiting.   Musculoskeletal: Negative for back pain and neck pain.   Skin: Positive for wound. Negative for rash.   Neurological: Positive for headaches. Negative for dizziness, seizures, syncope, weakness and numbness.   Psychiatric/Behavioral: Negative for agitation, behavioral problems and confusion.     Objective:     Vitals:    Temp: 98.1 °F (36.7 °C)  Pulse: 69  BP: (!) 125/59  MAP (mmHg): 85  Resp: 15  SpO2: 100 %  O2 Device (Oxygen Therapy): nasal cannula    Temp  Min: 98 °F (36.7 °C)  Max: 98.3 °F (36.8 °C)  Pulse  Min: 69  Max: 71  BP  Min: 114/55  Max: 163/79  MAP (mmHg)  Min: 76   Max: 97  Resp  Min: 15  Max: 18  SpO2  Min: 90 %  Max: 100 %    No intake/output data recorded.           Physical Exam  Constitutional:       General: He is not in acute distress.     Appearance: Normal appearance. He is normal weight.   HENT:      Mouth/Throat:      Mouth: Mucous membranes are moist.   Eyes:      Pupils: Pupils are equal, round, and reactive to light.      Comments: R periorbital swelling and ecchymosis  Unable to assess R pupil  Vision intact to light stimulus in R eye   Cardiovascular:      Rate and Rhythm: Normal rate and regular rhythm.      Pulses: Normal pulses.   Pulmonary:      Effort: Pulmonary effort is normal. No respiratory distress.      Breath sounds: No wheezing.   Abdominal:      General: Abdomen is flat. There is no distension.      Palpations: Abdomen is soft.      Tenderness: There is no abdominal tenderness.   Musculoskeletal:         General: No swelling, tenderness, deformity or signs of injury.   Skin:     General: Skin is warm and dry.      Comments: Laceration to R brow s/p 2 sutures   Neurological:      Mental Status: He is alert.      Comments: --GCS:  15  --Mental Status: AAO x 4  --CN II-XII grossly intact.   --left pupil 5 mm reactive, unable to assess Right 2/2 to periorbital swelling    --Motor: full strength throughout  --sensory: intact         Today I personally reviewed pertinent medications, lines/drains/airways, imaging, cardiology results, laboratory results, microbiology results, notably:  [facility]      Assessment/Plan:     Neuro  * Hemorrhage, subarachnoid, traumatic  Traumatic SAH after mechanical fall  -NSGY following   -Q 1 hour neuro checks   -Q 1 hour vital signs   -SBP goal <160  -6 hour repeat CTH stable   -hold DAPT in setting of SAH   -neuro exam stable   -keppra 500 mg BID x 7 days     Cardiac/Vascular  Essential hypertension  SBP goal <160  -hold PO meds in acute setting   -continuous cardiac monitoring     Coronary artery disease involving native  coronary artery of native heart without angina pectoris  Hold ASA and plavix in setting of SAH  -EKG       Endocrine  Type 2 diabetes mellitus without complication, without long-term current use of insulin  Hold PO meds in acute setting   -SSI protocol     Orthopedic  Soft tissue injury of face  2/2 to fall   -s/p 2 sutures to right brow   -CT max/face with no evidence of fracture   -warm/cold compress to periorbital swelling   -vision intact - continue to monitor    Fall  Mechanical fall  -PT/OT eval and treat          The patient is being Prophylaxed for:  Venous Thromboembolism with: Mechanical  Stress Ulcer with: Not Applicable   Ventilator Pneumonia with: not applicable    Activity Orders          Diet diabetic [facility]; 2000 Calorie: Diabetic starting at 10/17 0656        Full Code    AMBERLY PerryC  Neurocritical Care  [facility]

## 2020-10-17 NOTE — ASSESSMENT & PLAN NOTE
2/2 to fall   -s/p 2 sutures to right brow   -CT max/face with no evidence of fracture   -warm/cold compress to periorbital swelling   -vision intact - continue to monitor

## 2020-10-17 NOTE — PLAN OF CARE
Problem: Occupational Therapy Goal  Goal: Occupational Therapy Goal  Description: Goals to be met by: 10/31/2020     Patient will increase functional independence with ADLs by performing:    UE Dressing with Set-up Assistance.  LE Dressing with Minimal Assistance.  Grooming while standing at sink with Stand-by Assistance.  Toileting from toilet with Stand-by Assistance for hygiene and clothing management.   Toilet transfer to toilet with Stand-by Assistance.    Outcome: Ongoing, Progressing    Initial eval completed   POC implemented and goals established     Nery Quiroz, OTR/L  [phone]  10/17/2020

## 2020-10-17 NOTE — NURSING
Patient arrived to [facility] from ED     Type of stroke/diagnosis:  Traumatic SDH     Current symptoms: No h/a, bruising around R eye (very swollen)     Skin assessment done: Y    Patient Belongings on Admit: Boxer briefs, socks, Khaki shorts, , Orange polo, black new balance shoes, wallet, black flip phone, Gold ring left on patient's hand    NCC notified: Mely LOBATO

## 2020-10-17 NOTE — CONSULTS
Chief complaint/Reason for Consult: trauma to left eye, bruised, pruple, with ehmatoma around eye. Unable to open the eye     History of Present Illness: Jayesh Astudillo is a 89 y.o. male who presents with large periorbital ecchymosis. Fell and hit the corner of a door frame. Denies visual symptoms, though his swelling has obscured the view in his right eye. No flashes, floaters, curtain over vision, photophobia    Past Ocular Hx: CEIOL OU at [facility]     Current eye gtts: none      PMHx:  has a past medical history of Arrhythmia, Asthma, Atrial fibrillation, Chronic kidney disease, Coronary artery disease, Diabetes mellitus, Hyperlipidemia, Hypertension, Nocturia, Sick sinus syndrome, and Tachy-alton syndrome.     PSurgHx:  has a past surgical history that includes Joint replacement; Ankle surgery; Cholecystectomy; Tonsillectomy; Cardiac pacemaker placement; Coronary angioplasty with stent; Coronary artery bypass graft; and Colonoscopy.     Home Medications:   Prior to Admission medications    Medication Sig Start Date End Date Taking? Authorizing Provider   blood sugar diagnostic (ACCU-CHEK AURE PLUS TEST STRP) Strp USE 1 STRIP TO CHECK GLUCOSE ONCE DAILY FOR 90 DAYS 3/12/20  Yes Historical Provider   blood-glucose meter (ACCU-CHEK AURE PLUS METER) Misc USE AS DIRECTED FOR 90 DAYS 1/24/20  Yes Historical Provider   carvediloL (COREG) 3.125 MG tablet Take 3.125 mg by mouth. 7/2/20  Yes Historical Provider   clopidogreL (PLAVIX) 75 mg tablet Take 75 mg by mouth. 2/24/20 2/23/21 Yes Historical Provider   furosemide (LASIX) 40 MG tablet Take 40 mg by mouth. 4/15/20  Yes Historical Provider   lancets (ACCU-CHEK SOFTCLIX LANCETS) Misc USE AS DIRECTED ONCE DAILY FOR 30 DAYS 3/13/20  Yes Historical Provider   losartan (COZAAR) 25 MG tablet TAKE 1 TABLET EVERY DAY 7/2/20  Yes Historical Provider   metFORMIN (GLUCOPHAGE) 500 MG tablet Take 500 mg by mouth. 5/4/20  Yes Historical Provider   potassium chloride SA (K-DUR,KLOR-CON) 20  MEQ tablet Take 20 mEq by mouth. 10/1/20  Yes Historical Provider   ascorbic acid, vitamin C, (VITAMIN C) 1000 MG tablet Take 1,000 mg by mouth.    Historical Provider   atorvastatin (LIPITOR) 40 MG tablet Take 40 mg by mouth.    Historical Provider   calcium carbonate-vitamin D3 250-125 mg 250-125 mg-unit Tab Take 1 tablet by mouth.    Historical Provider   magnesium oxide (MAG-OX) 400 mg (241.3 mg magnesium) tablet Take 400 mg by mouth.    Historical Provider   vitamin E 400 UNIT capsule Take 400 Units by mouth.    Historical Provider        Medications this encounter:    levETIRAcetam  500 mg Oral BID    senna-docusate 8.6-50 mg  1 tablet Oral BID       Allergies: is allergic to iohexol.     Social Hx:  reports that he has quit smoking. He has never used smokeless tobacco. He reports current alcohol use. He reports that he does not use drugs.     Family Hx: No family history of glaucoma. family history is not on file.     ROS: Negative x 10 except for complaints as described in HPI; negative for fever, chills, weight loss, nausea, vomiting, diarrhea, shortness of breath, nasal discharge, cough, abdominal pain, dyspnea, difficulty moving arms and legs, confusion, dysuria, palpitations, or chest pain     Ocular examination/Dilated fundus examination:  Base Eye Exam     Visual Acuity       Right Left    Near cc 20/25 20/20          Tonometry (Tonopen, 9:26 PM)       Right Left    Pressure 19 13          Pupils       Dark Light Shape React APD    Right 4 2 Round Brisk None    Left 4 2 Round Brisk None          Extraocular Movement       Right Left     Full, Ortho Full, Ortho          Dilation     Both eyes: 1% Mydriacyl, 2.5% Phenylephrine @ 8:30 PM            Slit Lamp and Fundus Exam     External Exam       Right Left    External large periorbital ecchymosis and edema nasal ecchymosis          Pen Light Exam       Right Left    Lids/Lashes ecchymosis and lid edema normal    Conjunctiva/Sclera patchy subconj heme  White and quiet    Cornea clear Clear    Anterior Chamber Deep and formed Deep and formed    Iris Round and reactive Round and reactive    Lens PCIOL PCIOL    Vitreous Normal Normal          Fundus Exam       Right Left    Disc Normal Normal    C/D Ratio 0.3 0.3    Macula Normal Normal    Vessels Normal Normal    Periphery reticular degeneration 360 reticular degeneration 360                Assessment/Plan:   1. Subconjunctival hemorrhage, right eye  - S/p trauma to head  - CT without acute fracture but with with supra and infraorbital hematoma  - Good VA, no photophobia, IOP WNL, pupils reactive, DFE WNL  - Patient prefers to follow with outside ophthalmology as outpatient    Tyrone Wilks MD  [employer] Ophthalmology PGY-2

## 2020-10-17 NOTE — CONSULTS
Inpatient consult to Physical Medicine Rehab  Consult performed by: Daly Giles NP  Consult ordered by: Cassy Woods NP  Reason for consult: assess rehab needs        Reviewed patient history and current admission.  PM&R following.     ZAHIDA Snider, FNP-C  Physical Medicine & Rehabilitation   10/17/2020

## 2020-10-17 NOTE — CARE UPDATE
NSY signing off. Please page with any exam change or questions. Thank you for this interesting consult.          Addendum:   Okay to restart ASA/plavix      Plan d/w Dr. Chino.      --Everton Estrada MD

## 2020-10-17 NOTE — PLAN OF CARE
Problem: SLP Goal  Goal: SLP Goal  Description: Speech Therapy Short Term Goals  Goal expected to be met by 10/31  1. Pt will tolerate a regular diet and thin liquids with no overt s/s of aspiration.   2. Pt will participate in a speech, language, and cognitive evaluation with possible updated goals to follow pending results.    Outcome: Ongoing, Progressing  Bedside Swallow Study completed. Recommend: regular diet, thin liquids, standard aspiration precautions. ST will continue to follow to complete a [id number]-Mrlokbscx Evaluation. Full note to follow.   SOFÍA Strong., CCC-SLP  10/17/2020

## 2020-10-17 NOTE — PLAN OF CARE
CM spoke with patient's wife who is interested in inpatient rehab by her house, as she is 88 years old and her first choice is [facility].  Second choice was [facility].  CM put in referrals for both of these places.  Ms Aubree Astudillo would like someone to followup with her at [phone], as she is anxious to have her  moved to a rehab in [place].    CM sent referrals to both places thru [facility].

## 2020-10-17 NOTE — SUBJECTIVE & OBJECTIVE
"(Not in a hospital admission)      Review of patient's allergies indicates:   Allergen Reactions    Iohexol Other (See Comments)     Pt reprts possible reaction to "the cath dye". Pt reports "started shaking".        Past Medical History:   Diagnosis Date    Arrhythmia     Asthma     Atrial fibrillation     Chronic kidney disease     Coronary artery disease     Diabetes mellitus     Hyperlipidemia     Hypertension     Nocturia     Sick sinus syndrome     Tachy-alton syndrome      Past Surgical History:   Procedure Laterality Date    ANKLE SURGERY      CARDIAC PACEMAKER PLACEMENT      CHOLECYSTECTOMY      COLONOSCOPY      CORONARY ANGIOPLASTY WITH STENT PLACEMENT      CORONARY ARTERY BYPASS GRAFT      JOINT REPLACEMENT      TONSILLECTOMY       Family History     None        Tobacco Use    Smoking status: Former Smoker    Smokeless tobacco: Never Used   Substance and Sexual Activity    Alcohol use: Yes     Comment: couple times a week    Drug use: Never    Sexual activity: Not on file     Review of Systems   Constitutional: Negative for activity change.   HENT: Negative for congestion.    Neurological: Negative for weakness.   Psychiatric/Behavioral: Negative for behavioral problems.     Objective:     Weight: 83.9 kg (185 lb)  Body mass index is 25.8 kg/m².  Vital Signs (Most Recent):  Temp: 98.1 °F (36.7 °C) (10/17/20 0400)  Pulse: 70 (10/17/20 0430)  Resp: 16 (10/17/20 0430)  BP: 123/61 (10/17/20 0430)  SpO2: 100 % (10/17/20 0430) Vital Signs (24h Range):  Temp:  [98 °F (36.7 °C)-98.3 °F (36.8 °C)] 98.1 °F (36.7 °C)  Pulse:  [69-70] 70  Resp:  [16-18] 16  SpO2:  [90 %-100 %] 100 %  BP: (114-163)/(55-79) 123/61                          Neurosurgery Physical Exam     gcs 15 aox3 maew   Right eye swollen but able to open it. PERRL no drift. No meningismus.     Significant Labs:  Recent Labs   Lab 10/17/20  0211   *      K 3.9      CO2 24   BUN 24*   CREATININE 1.1   CALCIUM " 8.9     Recent Labs   Lab 10/17/20  0211   WBC 9.11   HGB 10.1*   HCT 31.2*   *     Recent Labs   Lab 10/17/20  0211   INR 1.0     Microbiology Results (last 7 days)     ** No results found for the last 168 hours. **        All pertinent labs from the last 24 hours have been reviewed.    Significant Diagnostics:  I have reviewed and interpreted all pertinent imaging results/findings within the past 24 hours.  Frontal tSAH on CTH from OSH

## 2020-10-17 NOTE — PT/OT/SLP EVAL
Occupational Therapy   Evaluation    Name: Jayesh Astudillo  MRN: [id number]  Admitting Diagnosis:  Hemorrhage, subarachnoid, traumatic      Recommendations:     Discharge Recommendations: rehabilitation facility(pending progress)  Discharge Equipment Recommendations:  (TBD pending progression)  Barriers to discharge:  Other (Comment)(Pt requires increased assistance at current functional assistance)    Assessment:     Jayesh Astudillo is a 89 y.o. male with a medical diagnosis of Hemorrhage, subarachnoid, traumatic.  He presents with performance deficits affecting function: weakness, impaired endurance, impaired self care skills, impaired functional mobilty, gait instability, impaired balance, visual deficits, decreased coordination, pain, impaired coordination, impaired cardiopulmonary response to activity, edema.  Pt present with impaired coordination and increased assistance for functional mobility and transfers at current functional level. Pt is not safe to return to the home environment at current functional level and would greatly benefit from IP Rehab in order to facilitate safe return home. Pt would benefit from continued skilled acute OT services in order to maximize independence and safety with ADLs and functional mobility to ensure safe return to PLOF in the least restrictive environment. OT recommending IP Rehab  once pt is medically appropriate for d/c.         Rehab Prognosis: Good; patient would benefit from acute skilled OT services to address these deficits and reach maximum level of function.       Plan:     Patient to be seen 4 x/week to address the above listed problems via self-care/home management, therapeutic exercises, therapeutic activities  · Plan of Care Expires: 11/15/20  · Plan of Care Reviewed with: patient    Subjective     Chief Complaint: R eye pain  Patient/Family Comments/goals: to get better and return home     Occupational Profile:  Living Environment: Pt lives with his wife in a [facility] with 1  RUBIO. Pt has a walk-in shower with threshold to enter and grab bar. Pt was driving.   Previous level of function: PTA, pt was (I) with ADLs and mod (I) <> (I) for functional mobility using SPC as needed   Roles and Routines:    Equipment Used at Home:  cane, straight, grab bar  Assistance upon Discharge: Pt will have assistance from family upon d/c.     Pain/Comfort:  · Pain Rating 1: 0/10  · Location - Side 1: Right  · Location - Orientation 1: lateral  · Location 1: eye  · Pain Addressed 1: Distraction  · Pain Rating Post-Intervention 1: 1/10    Patients cultural, spiritual, Lutheran conflicts given the current situation: no    Objective:     Communicated with: RN prior to session.  Patient found HOB elevated with telemetry, oxygen, blood pressure cuff, SCD upon OT entry to room. Pt agreeable to therapy session.     General Precautions: Standard, seizure, fall, aspiration   Orthopedic Precautions:N/A   Braces: N/A     Occupational Performance:    Bed Mobility:    · Patient completed Scooting/Bridging with minimum assistance and for anterior scooting towards EOB and max A x 2 person for supine scooting towards HOB   · Patient completed Supine to Sit with moderate assistance  · Patient completed Sit to Supine with CGA     Functional Mobility/Transfers:  · Patient completed Sit <> Stand Transfer from EOB with minimum assistance  with  hand-held assist   · Functional Mobility: Pt engaging in functional mobility to simulate household/community distances approx 6 ft  with mod A x 2 person and utilizing B HHA in order to maximize functional activity tolerance and standing balance required for engagement in occupations of choice.   · Increased forward flexion   · Pt pushing through therapist hand for stability   · Gait instability     Activities of Daily Living:  · Lower Body Dressing: total assistance to dpn B  socks. Pt attempted to bring B LEs into figure 4 position to pull up socks while sitting EOB but  unable to perform   · Toileting: minimum assistance seated EOB for voiding into urinal with set-up A for urinal placement     Cognitive/Visual Perceptual:  Cognitive/Psychosocial Skills:     -       Oriented to: Person, Place, Time and Situation   -       Follows Commands/attention:Follows multistep  commands  -       Communication: clear/fluent  -       Memory: No Deficits noted  -       Safety awareness/insight to disability: intact   -       Mood/Affect/Coping skills/emotional control: Appropriate to situation  Visual/Perceptual:      -Intact but R eye closed shut due to swelling    Physical Exam:  Balance:     Static sit: SBA   Dynamic sit: CGA   Static standing: min A with HHA    Dynamic Standing: mod A x 2 person using B HHA    Postural examination/scapula alignment:    -       Rounded shoulders  -       Forward head  -       Abnormal trunk flexion  Skin integrity: Bruising of R eye with laceration over R eye   Edema:  Severe R Eye   Sensation:    -       Intact  light/touch for B UEs  Dominant hand:    -       right   Upper Extremity Range of Motion:     -       Right Upper Extremity: WFL except limted shoulder flexion to 120*  -       Left Upper Extremity: WFL except limited shoulder flexion ~120*  Upper Extremity Strength:    -       Right Upper Extremity: grossly 4-/5   -       Left Upper Extremity: grossly 4-/5    Strength:    -       Right Upper Extremity: WFL  -       Left Upper Extremity: WFL   FM coordination: mild deficits noted with hand to finger coordination laterally- could potentially be due to visual deficits vs coordination deficits.     AMPAC 6 Click ADL:  AMPAC Total Score: 14    Treatment & Education:   Pt educated on role of OT, POC, and goals for therapy.     POC was dicussed with patient/caregiver, who was included in its development and is in agreement with the identified goals and treatment plan.    Pt educated on d/c recs for IP Rehab    Time provided for therapeutic  counseling and discussion of health disposition.    Educated on importance of EOB/OOB mobility, maintaining routine, sitting up in chair, and maximizing independence with ADLs during admission    Pt completed ADLs and functional mobility for treatment session as noted above    Pt/caregiver verbalized understanding and expressed no further concerns/questions.  Co-tx with PT performed due to need for education and assistance from two skilled therapy disciplines at pt's current functional level and maximal functional performance    Updated communication board with level of assist required (mod A x 2 person assistance & B HHA) & educated RN/patient that pt is appropriate for transfers with RN/PCT.     Education:    Patient left HOB elevated with all lines intact, call button in reach, bed alarm on and RN notified    GOALS:   Multidisciplinary Problems     Occupational Therapy Goals        Problem: Occupational Therapy Goal    Goal Priority Disciplines Outcome Interventions   Occupational Therapy Goal     OT, PT/OT Ongoing, Progressing    Description: Goals to be met by: 10/31/2020     Patient will increase functional independence with ADLs by performing:    UE Dressing with Set-up Assistance.  LE Dressing with Minimal Assistance.  Grooming while standing at sink with Stand-by Assistance.  Toileting from toilet with Stand-by Assistance for hygiene and clothing management.   Toilet transfer to toilet with Stand-by Assistance.                     History:     Past Medical History:   Diagnosis Date    Arrhythmia     Asthma     Atrial fibrillation     Chronic kidney disease     Coronary artery disease     Diabetes mellitus     Hyperlipidemia     Hypertension     Nocturia     Sick sinus syndrome     Tachy-alton syndrome        Past Surgical History:   Procedure Laterality Date    ANKLE SURGERY      CARDIAC PACEMAKER PLACEMENT      CHOLECYSTECTOMY      COLONOSCOPY      CORONARY ANGIOPLASTY WITH STENT  PLACEMENT      CORONARY ARTERY BYPASS GRAFT      JOINT REPLACEMENT      TONSILLECTOMY         Time Tracking:     OT Date of Treatment: 10/17/20  OT Start Time: 0915  OT Stop Time: 0940  OT Total Time (min): 25 min    Billable Minutes:Evaluation 15  Self Care/Home Management 8    Nery Quiroz, OT  10/17/2020

## 2020-10-17 NOTE — ASSESSMENT & PLAN NOTE
88 y/o M on ASA/plavix with stent and pace kiersten presented after a fall found to have frontal tSAH presenting GCS 15    icu q1 hr enuro checks and vitals.   Stop asa and plavix.   keppra 500 BID  7 days    No platlets given. Awaiting stability scan about to occur.     No acute NSGY intervention.

## 2020-10-17 NOTE — PT/OT/SLP EVAL
Physical Therapy Evaluation    Patient Name:  Jayesh Astudillo   MRN:  [id number]    Recommendations:     Discharge Recommendations:  rehabilitation facility   Discharge Equipment Recommendations: (TBD)   Barriers to discharge: Decreased caregiver support (limited assist available; requiring increased assist with mobility at this time)    Assessment:     Jayesh Astudillo is a 89 y.o. male admitted with a medical diagnosis of Hemorrhage, subarachnoid, traumatic.  He presents with the following impairments/functional limitations:  weakness, impaired endurance, impaired self care skills, impaired functional mobilty, gait instability, impaired balance, decreased safety awareness, decreased coordination, decreased lower extremity function, impaired coordination. Pt pleasant and agreeable throughout session, despite reporting increased fatigue. Tolerated session with vitals remaining stable and without c/o increased pain, dizziness, or other symptoms. Pt required assist to complete functional mobility this date. Demo'd impaired balance and coordination with slight ataxic pattern noted during gait trial with increased reliance on BUE support. Pt would continue to benefit from skilled acute PT in order to address current deficits and progress functional mobility. Currently recommending IP rehab upon d/c, as pt was previously (I) with mobility and he does not have adequate caregiver assist available upon d/c.     Rehab Prognosis: Good; patient would benefit from acute skilled PT services to address these deficits and reach maximum level of function.    Recent Surgery: * No surgery found *      Plan:     During this hospitalization, patient to be seen 4 x/week to address the identified rehab impairments via gait training, therapeutic activities, therapeutic exercises, neuromuscular re-education and progress toward the following goals:    · Plan of Care Expires:  11/15/20    Subjective     Chief Complaint: R eye swelling;  fatigue  Patient/Family Comments/goals: return home  Pain/Comfort:  · Pain Rating 1: (did not rate)  · Location 1: head(and R eye/face)  · Pain Addressed 1: Reposition, Distraction    Patients cultural, spiritual, Buddhist conflicts given the current situation: no    Living Environment:  Pt lives with his wife in a 1SH with  to enter.   Prior to admission, patients level of function was (I) with ADLs and household mobility; reports occasional use of SPC for longer community distances.  Pt was driving PTA. Equipment used at home: cane, straight, grab bar.  Upon discharge, patient will have assistance from wife and neighbors.    Objective:     Communicated with RN prior to session.  Patient found supine with telemetry, bed alarm, pulse ox (continuous), blood pressure cuff, oxygen  upon PT entry to room.    General Precautions: Standard, fall, aspiration, seizure   Orthopedic Precautions:N/A   Braces: N/A     Exams:  · Cognitive Exam:  Patient is oriented to Person, Place, Time and Situation  · Fine Motor Coordination:    · -       Intact: Left hand, diadochokinesis skill , Right hand, diadochokinesis skill  and LLE heel shin  · -       Impaired:  RLE heel shin; mild dysmetria noted with finger to nose BUE  · Postural Exam:  Patient presented with the following abnormalities:    · -       Rounded shoulders  · -       Forward head  · Sensation:    · -       Intact  · Skin Integrity/Edema:      · -       Skin integrity: Bruising and swelling of R eye and periorbital area  · RLE ROM: WFL  · RLE Strength: WFL except hip flex 4/5  · LLE ROM: WFL  · LLE Strength: WFL except hip flex 4/5    Functional Mobility:  · Bed Mobility:     · Supine to Sit: moderate assistance, x2 reps (managing trunk)  · Cues provided for sequencing and technique   · Sit to Supine: contact guard assistance, x2 reps   · Supine scooting to HOB: maxAx2 using drawsheet  · Cues provided for hooklying position and use of BLE to assist  · Transfers:      · Sit to Stand:  minimum assistance with no AD, x2 reps from EOB  · Gait: ~6 ft. forward/backward + ~2 ft. lateral steps along EOB + x3 reps MIP with modAx2 and HHAx2  · Ataxic pattern noted with impaired weight-shifting ability, inconsistent foot placement, decreased coordination of steps, decreased postural control, and instability  · demo'd LE (R>L) functional weakness during gait trials with increased difficulty bearing weight on RLE during SLS  · Cues provided throughout for sequencing of steps, safe technique, and appropriate weight-shifts     Therapeutic Activities and Exercises:  Pt educated on role of PT and PT POC. Pt verbalized understanding.   SBP parameter <160 per MD. BP seated EOB: 126/62. BP in standin/60. Denied dizziness, lightheadedness, increased in HA throughout  VSS throughout.   Discussed rehab impairments, d/c recs, and reasoning for recs with pt verbalizing understanding.  Following initial return to supine, pt reporting urge to void. Again assisted pt to sit EOB. CGA provided for sitting balance while pt voided in urinal sitting EOB   Pt oriented to call bell and instructed to call for staff assist with standing tasks/transfers. Pt verbalized understanding.   RN notified of pt functional status and level of assist needed for transfers     AM-PAC 6 CLICK MOBILITY  Total Score:13     Patient left supine with all lines intact, call button in reach, bed alarm on and RN notified.    GOALS:   Multidisciplinary Problems     Physical Therapy Goals        Problem: Physical Therapy Goal    Goal Priority Disciplines Outcome Goal Variances Interventions   Physical Therapy Goal     PT, PT/OT Ongoing, Progressing     Description: Goals to be met by: 10/27/2020     Patient will increase functional independence with mobility by performin. Supine to sit with Stand-by Assistance  2. Sit to stand transfer with Stand-by Assistance  3. Bed to chair transfer with Contact Guard Assistance using  LRAD as needed  4. Gait  x 150 feet with Contact Guard Assistance using LRAD as needed.  5. Lower extremity exercise program x15 reps, with supervision, in order to increase LE strength and (I) with functional mobility.                       History:     Past Medical History:   Diagnosis Date    Arrhythmia     Asthma     Atrial fibrillation     Chronic kidney disease     Coronary artery disease     Diabetes mellitus     Hyperlipidemia     Hypertension     Nocturia     Sick sinus syndrome     Tachy-alton syndrome        Past Surgical History:   Procedure Laterality Date    ANKLE SURGERY      CARDIAC PACEMAKER PLACEMENT      CHOLECYSTECTOMY      COLONOSCOPY      CORONARY ANGIOPLASTY WITH STENT PLACEMENT      CORONARY ARTERY BYPASS GRAFT      JOINT REPLACEMENT      TONSILLECTOMY         Time Tracking:     PT Received On: 10/17/20  PT Start Time: 0917     PT Stop Time: 0946  PT Total Time (min): 29 min     Billable Minutes: Evaluation 20 and Therapeutic Activity 9  (co-eval performed this date due to need for 2 skilled therapists in order to ensure pt safety, accomodate for pt activity tolerance, and maximize functional potential)    Kayla Hou, PT, DPT   10/17/2020  [phone]

## 2020-10-17 NOTE — HOSPITAL COURSE
10/17/2020 admit to [facility] for traumatic SAH  10/18/2020: Seen by karlee and estuardo to f/u as an outpatient. Can restart ASA and Plavix per NSGY.

## 2020-10-17 NOTE — PLAN OF CARE
PT evaluation complete and appropriate goals established.    Kayla Hou, PT, DPT   10/17/2020  [phone]    Problem: Physical Therapy Goal  Goal: Physical Therapy Goal  Description: Goals to be met by: 10/27/2020     Patient will increase functional independence with mobility by performin. Supine to sit with Stand-by Assistance  2. Sit to stand transfer with Stand-by Assistance  3. Bed to chair transfer with Contact Guard Assistance using LRAD as needed  4. Gait  x 150 feet with Contact Guard Assistance using LRAD as needed.  5. Lower extremity exercise program x15 reps, with supervision, in order to increase LE strength and (I) with functional mobility.      Outcome: Ongoing, Progressing

## 2020-10-17 NOTE — PLAN OF CARE
[facility] Care Plan    POC reviewed with Jayesh Astudillo and family at 1400. Pt verbalized understanding. Questions and concerns addressed. No acute events today. CT head completed. Pt progressing toward goals. Will continue to monitor. See below and flowsheets for full assessment and VS info.       Neuro:  Boyden Coma Scale  Best Eye Response: 4-->(E4) spontaneous  Best Motor Response: 6-->(M6) obeys commands  Best Verbal Response: 5-->(V5) oriented  Boyden Coma Scale Score: 15  Assessment Qualifiers: patient not sedated/intubated  Pupil PERRLA: yes     24 hr Temp:  [98 °F (36.7 °C)-99.1 °F (37.3 °C)]     CV:   Rhythm: paced rhythm  BP goals:   SBP < 160  MAP > 65    Resp:   O2 Device (Oxygen Therapy): nasal cannula       Plan: N/A    GI/:  ERMA Total Score: 19  Diet/Nutrition Received: consistent carb/diabetic diet  Last Bowel Movement: 10/16/20       Intake/Output Summary (Last 24 hours) at 10/17/2020 1842  Last data filed at 10/17/2020 1800  Gross per 24 hour   Intake 750 ml   Output 625 ml   Net 125 ml     Unmeasured Output  Stool Occurrence: 0    Labs/Accuchecks:  Recent Labs   Lab 10/17/20  0211   WBC 9.11   RBC 3.15*   HGB 10.1*   HCT 31.2*   *      Recent Labs   Lab 10/17/20  0211      K 3.9   CO2 24      BUN 24*   CREATININE 1.1   ALKPHOS 183*   ALT 21   AST 28   BILITOT 1.1*      Recent Labs   Lab 10/17/20  0211   INR 1.0    No results for input(s): CPK, CPKMB, TROPONINI, MB in the last 168 hours.    Electrolytes: N/A - electrolytes WDL  Accuchecks: Q6H    Gtts:       LDA/Wounds:  Lines/Drains/Airways       Peripheral Intravenous Line                   Peripheral IV - Single Lumen 10/17/20 0551 20 G Right Forearm less than 1 day         Peripheral IV - Single Lumen 10/17/20 18 G Right Antecubital less than 1 day                  Wounds: Yes  Wound care consulted: No

## 2020-10-17 NOTE — HPI
Pt is an 89 y.o. male with PMHx of DM, HTN, CAD with stents on ASA and plavix, cardiac pacemaker placement who presents as a transfer from OSH for traumatic SAH. Patient was at home last night when he had a mechanical fall after turning too fast on his brick floors. He hit his head on a ledge while falling. He denies LOC. He went to an OSH where CTH revealed small traumatic SAH and CT maxillofacial negative for facial fractures. [place] [facility] [place] GCS 15 on initial presentation.

## 2020-10-18 LAB
ALBUMIN SERPL BCP-MCNC: 2.9 G/DL (ref 3.5–5.2)
ALP SERPL-CCNC: 153 U/L (ref 55–135)
ALT SERPL W/O P-5'-P-CCNC: 14 U/L (ref 10–44)
ANION GAP SERPL CALC-SCNC: 8 MMOL/L (ref 8–16)
AST SERPL-CCNC: 21 U/L (ref 10–40)
BASOPHILS # BLD AUTO: 0.04 K/UL (ref 0–0.2)
BASOPHILS NFR BLD: 0.5 % (ref 0–1.9)
BILIRUB SERPL-MCNC: 1 MG/DL (ref 0.1–1)
BUN SERPL-MCNC: 19 MG/DL (ref 8–23)
CALCIUM SERPL-MCNC: 8.2 MG/DL (ref 8.7–10.5)
CHLORIDE SERPL-SCNC: 106 MMOL/L (ref 95–110)
CO2 SERPL-SCNC: 24 MMOL/L (ref 23–29)
CREAT SERPL-MCNC: 0.9 MG/DL (ref 0.5–1.4)
DIFFERENTIAL METHOD: ABNORMAL
EOSINOPHIL # BLD AUTO: 0.3 K/UL (ref 0–0.5)
EOSINOPHIL NFR BLD: 3.6 % (ref 0–8)
ERYTHROCYTE [DISTWIDTH] IN BLOOD BY AUTOMATED COUNT: 17 % (ref 11.5–14.5)
EST. GFR  (AFRICAN AMERICAN): >60 ML/MIN/1.73 M^2
EST. GFR  (NON AFRICAN AMERICAN): >60 ML/MIN/1.73 M^2
GLUCOSE SERPL-MCNC: 151 MG/DL (ref 70–110)
HCT VFR BLD AUTO: 26.4 % (ref 40–54)
HGB BLD-MCNC: 8.6 G/DL (ref 14–18)
IMM GRANULOCYTES # BLD AUTO: 0.02 K/UL (ref 0–0.04)
IMM GRANULOCYTES NFR BLD AUTO: 0.3 % (ref 0–0.5)
LYMPHOCYTES # BLD AUTO: 0.8 K/UL (ref 1–4.8)
LYMPHOCYTES NFR BLD: 10 % (ref 18–48)
MAGNESIUM SERPL-MCNC: 2.1 MG/DL (ref 1.6–2.6)
MCH RBC QN AUTO: 32.3 PG (ref 27–31)
MCHC RBC AUTO-ENTMCNC: 32.6 G/DL (ref 32–36)
MCV RBC AUTO: 99 FL (ref 82–98)
MONOCYTES # BLD AUTO: 1.1 K/UL (ref 0.3–1)
MONOCYTES NFR BLD: 14.2 % (ref 4–15)
NEUTROPHILS # BLD AUTO: 5.6 K/UL (ref 1.8–7.7)
NEUTROPHILS NFR BLD: 71.4 % (ref 38–73)
NRBC BLD-RTO: 0 /100 WBC
PHOSPHATE SERPL-MCNC: 3.2 MG/DL (ref 2.7–4.5)
PLATELET # BLD AUTO: 111 K/UL (ref 150–350)
PMV BLD AUTO: 9.6 FL (ref 9.2–12.9)
POCT GLUCOSE: 135 MG/DL (ref 70–110)
POCT GLUCOSE: 161 MG/DL (ref 70–110)
POCT GLUCOSE: 196 MG/DL (ref 70–110)
POCT GLUCOSE: 204 MG/DL (ref 70–110)
POCT GLUCOSE: 236 MG/DL (ref 70–110)
POCT GLUCOSE: 246 MG/DL (ref 70–110)
POTASSIUM SERPL-SCNC: 3.7 MMOL/L (ref 3.5–5.1)
POTASSIUM SERPL-SCNC: 4.2 MMOL/L (ref 3.5–5.1)
PROT SERPL-MCNC: 5.6 G/DL (ref 6–8.4)
RBC # BLD AUTO: 2.66 M/UL (ref 4.6–6.2)
SODIUM SERPL-SCNC: 138 MMOL/L (ref 136–145)
WBC # BLD AUTO: 7.83 K/UL (ref 3.9–12.7)

## 2020-10-18 PROCEDURE — 83735 ASSAY OF MAGNESIUM: CPT

## 2020-10-18 PROCEDURE — 94761 N-INVAS EAR/PLS OXIMETRY MLT: CPT

## 2020-10-18 PROCEDURE — 11000001 HC ACUTE MED/SURG PRIVATE ROOM

## 2020-10-18 PROCEDURE — 85025 COMPLETE CBC W/AUTO DIFF WBC: CPT

## 2020-10-18 PROCEDURE — 63600175 PHARM REV CODE 636 W HCPCS: Performed by: NURSE PRACTITIONER

## 2020-10-18 PROCEDURE — 84132 ASSAY OF SERUM POTASSIUM: CPT

## 2020-10-18 PROCEDURE — 25000003 PHARM REV CODE 250: Performed by: NURSE PRACTITIONER

## 2020-10-18 PROCEDURE — 80053 COMPREHEN METABOLIC PANEL: CPT

## 2020-10-18 PROCEDURE — 25000003 PHARM REV CODE 250: Performed by: STUDENT IN AN ORGANIZED HEALTH CARE EDUCATION/TRAINING PROGRAM

## 2020-10-18 PROCEDURE — 27000221 HC OXYGEN, UP TO 24 HOURS

## 2020-10-18 PROCEDURE — 84100 ASSAY OF PHOSPHORUS: CPT

## 2020-10-18 RX ORDER — NAPROXEN SODIUM 220 MG/1
81 TABLET, FILM COATED ORAL DAILY
Status: DISCONTINUED | OUTPATIENT
Start: 2020-10-18 | End: 2020-10-19 | Stop reason: HOSPADM

## 2020-10-18 RX ORDER — ATORVASTATIN CALCIUM 20 MG/1
40 TABLET, FILM COATED ORAL DAILY
Status: DISCONTINUED | OUTPATIENT
Start: 2020-10-18 | End: 2020-10-19 | Stop reason: HOSPADM

## 2020-10-18 RX ORDER — CARVEDILOL 3.12 MG/1
3.12 TABLET ORAL 2 TIMES DAILY
Status: DISCONTINUED | OUTPATIENT
Start: 2020-10-18 | End: 2020-10-19 | Stop reason: HOSPADM

## 2020-10-18 RX ORDER — CLOPIDOGREL BISULFATE 75 MG/1
75 TABLET ORAL DAILY
Status: DISCONTINUED | OUTPATIENT
Start: 2020-10-18 | End: 2020-10-19 | Stop reason: HOSPADM

## 2020-10-18 RX ADMIN — INSULIN ASPART 2 UNITS: 100 INJECTION, SOLUTION INTRAVENOUS; SUBCUTANEOUS at 11:10

## 2020-10-18 RX ADMIN — DOCUSATE SODIUM 50MG AND SENNOSIDES 8.6MG 1 TABLET: 8.6; 5 TABLET, FILM COATED ORAL at 08:10

## 2020-10-18 RX ADMIN — INSULIN ASPART 4 UNITS: 100 INJECTION, SOLUTION INTRAVENOUS; SUBCUTANEOUS at 06:10

## 2020-10-18 RX ADMIN — CLOPIDOGREL 75 MG: 75 TABLET, FILM COATED ORAL at 12:10

## 2020-10-18 RX ADMIN — INSULIN ASPART 1 UNITS: 100 INJECTION, SOLUTION INTRAVENOUS; SUBCUTANEOUS at 12:10

## 2020-10-18 RX ADMIN — ASPIRIN 81 MG CHEWABLE TABLET 81 MG: 81 TABLET CHEWABLE at 12:10

## 2020-10-18 RX ADMIN — CARVEDILOL 3.12 MG: 3.12 TABLET, FILM COATED ORAL at 08:10

## 2020-10-18 RX ADMIN — ACETAMINOPHEN 650 MG: 325 TABLET ORAL at 08:10

## 2020-10-18 RX ADMIN — POTASSIUM CHLORIDE 40 MEQ: 7.46 INJECTION, SOLUTION INTRAVENOUS at 03:10

## 2020-10-18 RX ADMIN — LEVETIRACETAM 500 MG: 500 TABLET ORAL at 08:10

## 2020-10-18 RX ADMIN — ATORVASTATIN CALCIUM 40 MG: 20 TABLET, FILM COATED ORAL at 12:10

## 2020-10-18 NOTE — ASSESSMENT & PLAN NOTE
Traumatic SAH after mechanical fall  -NSGY following   -Q 1 hour neuro checks   -Q 1 hour vital signs   -SBP goal <160  -6 hour repeat CTH stable   -restart DAPT  -neuro exam stable   -keppra 500 mg BID x 7 days

## 2020-10-18 NOTE — SUBJECTIVE & OBJECTIVE
Interval History:  Ophtho saw the pt and he is okay to f/u as an o/p. Vision and swelling have been improving and no fracture identified. Per NSGY, can restarted ASA and Plavix today.     Review of Systems   Constitutional: Negative for chills and fever.   HENT: Negative for facial swelling, hearing loss and voice change.    Eyes: Negative for pain and visual disturbance.   Respiratory: Negative for chest tightness and shortness of breath.    Cardiovascular: Negative for chest pain and leg swelling.   Gastrointestinal: Negative for abdominal pain, nausea and vomiting.   Musculoskeletal: Negative for back pain and neck pain.   Skin: Positive for wound. Negative for rash.   Neurological: Negative for dizziness, seizures, syncope, weakness, numbness and headaches.   Psychiatric/Behavioral: Negative for agitation, behavioral problems and confusion.       Objective:     Vitals:  Temp: 98.5 °F (36.9 °C)  Pulse: 70  Rhythm: paced rhythm  BP: 118/71  MAP (mmHg): 79  Resp: (!) 25  SpO2: 100 %  O2 Device (Oxygen Therapy): nasal cannula    Temp  Min: 98.5 °F (36.9 °C)  Max: 100.5 °F (38.1 °C)  Pulse  Min: 68  Max: 73  BP  Min: 95/50  Max: 132/60  MAP (mmHg)  Min: 69  Max: 88  Resp  Min: 17  Max: 31  SpO2  Min: 92 %  Max: 100 %    10/17 0701 - 10/18 0700  In: 1400 [P.O.:1000]  Out: 1175 [Urine:1175]   Unmeasured Output  Urine Occurrence: 1  Stool Occurrence: 0       Physical Exam  Constitutional:       General: He is not in acute distress.     Appearance: Normal appearance. He is normal weight.   HENT:      Mouth/Throat:      Mouth: Mucous membranes are moist.      Pharynx: No oropharyngeal exudate.   Eyes:      General: No scleral icterus.     Extraocular Movements: Extraocular movements intact.      Pupils: Pupils are equal, round, and reactive to light.      Comments: B/l periorbital ecchymosis, swelling greatly improved  R eye injected   Neck:      Musculoskeletal: Normal range of motion and neck supple.   Cardiovascular:       Rate and Rhythm: Normal rate and regular rhythm.      Pulses: Normal pulses.      Heart sounds: No murmur.   Pulmonary:      Effort: Pulmonary effort is normal. No respiratory distress.      Breath sounds: Normal breath sounds. No wheezing.   Abdominal:      General: Abdomen is flat. Bowel sounds are normal. There is no distension.      Palpations: Abdomen is soft.      Tenderness: There is no abdominal tenderness.   Musculoskeletal: Normal range of motion.         General: No swelling or tenderness.   Skin:     General: Skin is warm and dry.      Comments: Laceration to R brow s/p 2 sutures   Neurological:      Mental Status: He is alert and oriented to person, place, and time.      GCS: GCS eye subscore is 4. GCS verbal subscore is 5. GCS motor subscore is 6.      Cranial Nerves: No cranial nerve deficit.      Motor: No weakness.   Psychiatric:         Mood and Affect: Mood normal.         Behavior: Behavior normal.         Medications:  Continuous ScheduledlevETIRAcetam, 500 mg, BID  senna-docusate 8.6-50 mg, 1 tablet, BID    PRNacetaminophen, 650 mg, Q6H PRN  dextrose 50%, 12.5 g, PRN  glucagon (human recombinant), 1 mg, PRN  insulin aspart U-100, 1-10 Units, Q6H PRN  labetalol, 10 mg, Q6H PRN  magnesium sulfate IVPB, 2 g, PRN  magnesium sulfate IVPB, 4 g, PRN  ondansetron, 4 mg, Q6H PRN  potassium chloride in water, 40 mEq, PRN    And  potassium chloride in water, 60 mEq, PRN    And  potassium chloride in water, 80 mEq, PRN  sodium chloride 0.9%, 10 mL, PRN  sodium phosphate IVPB, 15 mmol, PRN  sodium phosphate IVPB, 20.01 mmol, PRN  sodium phosphate IVPB, 30 mmol, PRN      Today I personally reviewed pertinent medications, lines/drains/airways, imaging, laboratory results, notably:    Diet  Diet diabetic [facility]; 2000 Calorie  Diet diabetic [facility]; 2000 Calorie

## 2020-10-18 NOTE — PLAN OF CARE
[facility] Care Plan    POC reviewed with Jayesh Baudilio and family at 1400. Pt verbalized understanding. Questions and concerns addressed. No acute events today. Pt progressing toward goals. Pt transferred to step down unit. See below and flowsheets for full assessment and VS info.       Neuro:  [place] Coma Scale  Best Eye Response: 4-->(E4) spontaneous  Best Motor Response: 6-->(M6) obeys commands  Best Verbal Response: 5-->(V5) oriented  [place] Coma Scale Score: 15  Assessment Qualifiers: patient not sedated/intubated  Pupil PERRLA: yes     24 hr Temp:  [98.4 °F (36.9 °C)-100.5 °F (38.1 °C)]     CV:   Rhythm: paced rhythm  BP goals:   SBP < 160  MAP > 65    Resp:   O2 Device (Oxygen Therapy): nasal cannula       Plan: N/A    GI/:  ERMA Total Score: 19  Diet/Nutrition Received: consistent carb/diabetic diet  Last Bowel Movement: 10/16/20       Intake/Output Summary (Last 24 hours) at 10/18/2020 1858  Last data filed at 10/18/2020 1700  Gross per 24 hour   Intake 1800 ml   Output 1200 ml   Net 600 ml     Unmeasured Output  Urine Occurrence: 1  Stool Occurrence: 0    Labs/Accuchecks:  Recent Labs   Lab 10/18/20  0106   WBC 7.83   RBC 2.66*   HGB 8.6*   HCT 26.4*   *      Recent Labs   Lab 10/18/20  0106 10/18/20  1334     --    K 3.7 4.2   CO2 24  --      --    BUN 19  --    CREATININE 0.9  --    ALKPHOS 153*  --    ALT 14  --    AST 21  --    BILITOT 1.0  --       Recent Labs   Lab 10/17/20  0211   INR 1.0    No results for input(s): CPK, CPKMB, TROPONINI, MB in the last 168 hours.    Electrolytes: Electrolytes replaced  Accuchecks: Q6H    Gtts:       LDA/Wounds:  Lines/Drains/Airways       Peripheral Intravenous Line                   Peripheral IV - Single Lumen 10/17/20 18 G Right Antecubital 1 day         Peripheral IV - Single Lumen 10/18/20 0300 20 G Anterior;Left;Proximal Forearm less than 1 day                  Wounds: Yes  Wound care consulted: No

## 2020-10-18 NOTE — PLAN OF CARE
[facility] Care Plan    POC reviewed with Jayesh Astudillo at 0500. Pt verbalized understanding. Questions and concerns addressed. No acute events overnight. Pt progressing toward goals. Will continue to monitor. See below and flowsheets for full assessment and VS info.       Neuro:  [place] Coma Scale  Best Eye Response: 4-->(E4) spontaneous  Best Motor Response: 6-->(M6) obeys commands  Best Verbal Response: 5-->(V5) oriented  [place] Coma Scale Score: 15  Assessment Qualifiers: patient not sedated/intubated, no eye obstruction present  Pupil PERRLA: yes     24hr Temp:  [98.8 °F (37.1 °C)-100.5 °F (38.1 °C)]     CV:   Rhythm: paced rhythm  BP goals:   SBP < 160  MAP > 65    Resp:   O2 Device (Oxygen Therapy): nasal cannula       Plan:  prevent and minimize complications of SAH    GI/:  ERMA Total Score: 19  Diet/Nutrition Received: consistent carb/diabetic diet  Last Bowel Movement: 10/16/20       Intake/Output Summary (Last 24 hours) at 10/18/2020 0555  Last data filed at 10/18/2020 0405  Gross per 24 hour   Intake 1400 ml   Output 1175 ml   Net 225 ml     Unmeasured Output  Urine Occurrence: 1  Stool Occurrence: 0    Labs/Accuchecks:  Recent Labs   Lab 10/18/20  0106   WBC 7.83   RBC 2.66*   HGB 8.6*   HCT 26.4*   *      Recent Labs   Lab 10/18/20  0106      K 3.7   CO2 24      BUN 19   CREATININE 0.9   ALKPHOS 153*   ALT 14   AST 21   BILITOT 1.0      Recent Labs   Lab 10/17/20  0211   INR 1.0    No results for input(s): CPK, CPKMB, TROPONINI, MB in the last 168 hours.    Electrolytes: Electrolytes replaced  Accuchecks: Q6H    Gtts:       LDA/Wounds:  Lines/Drains/Airways       Peripheral Intravenous Line                   Peripheral IV - Single Lumen 10/17/20 0551 20 G Right Forearm 1 day         Peripheral IV - Single Lumen 10/17/20 18 G Right Antecubital 1 day                  Wounds: No  Wound care consulted: No

## 2020-10-18 NOTE — NURSING
Nursing Transfer Note       Transfer to 911 from 9096    Transfer via wheelchair    Transfer with O2 and cardiac monitor    Transported by RN    Medicines sent:no    Chart sent with patient: yes    Belongings sent with patient: yes clothing, shoes, cell phone    Notified: wife    Bedside Neuro assessment performed: yes    Bedside Handoff given to: RN    Upon arrival to floor: bed in lowest locked position, call bell within reach, RN at bedside.

## 2020-10-18 NOTE — PROGRESS NOTES
[facility]  Neurocritical Care  Progress Note    Admit Date: 10/17/2020  Service Date: 10/18/2020  Length of Stay: 1    Subjective:     Chief Complaint: Hemorrhage, subarachnoid, traumatic    History of Present Illness: Pt is an 89 y.o. male with PMHx of DM, HTN, CAD with stents on ASA and plavix who presents as a transfer from OSH for traumatic SAH. Patient was at home last night when he had a mechanical fall after turning too fast on his brick floors. He hit his head on a ledge while falling. He denies losing consciousness. He went to an OSH where CTH revealed small traumatic SAH and CT maxillofacial negative for facial fractures. Patient was transferred to [facility] for neurosurgical evaluation. Per NSGY request patient is admitted to [facility] for Q 1 hour neuro checks and higher level care.     Hospital Course: 10/17/2020 admit to [facility] for traumatic SAH  10/18/2020: Seen by vicenta and okay to f/u as an outpatient. Can restart ASA and Plavix per NSGY.    Interval History:  Vicenta saw the pt and he is okay to f/u as an o/p. Vision and swelling have been improving and no fracture identified. Per NSGY, can restarted ASA and Plavix today.     Review of Systems   Constitutional: Negative for chills and fever.   HENT: Negative for facial swelling, hearing loss and voice change.    Eyes: Negative for pain and visual disturbance.   Respiratory: Negative for chest tightness and shortness of breath.    Cardiovascular: Negative for chest pain and leg swelling.   Gastrointestinal: Negative for abdominal pain, nausea and vomiting.   Musculoskeletal: Negative for back pain and neck pain.   Skin: Positive for wound. Negative for rash.   Neurological: Negative for dizziness, seizures, syncope, weakness, numbness and headaches.   Psychiatric/Behavioral: Negative for agitation, behavioral problems and confusion.       Objective:     Vitals:  Temp: 98.5 °F (36.9 °C)  Pulse: 70  Rhythm: paced rhythm  BP: 118/71  MAP (mmHg):  79  Resp: (!) 25  SpO2: 100 %  O2 Device (Oxygen Therapy): nasal cannula    Temp  Min: 98.5 °F (36.9 °C)  Max: 100.5 °F (38.1 °C)  Pulse  Min: 68  Max: 73  BP  Min: 95/50  Max: 132/60  MAP (mmHg)  Min: 69  Max: 88  Resp  Min: 17  Max: 31  SpO2  Min: 92 %  Max: 100 %    10/17 0701 - 10/18 0700  In: 1400 [P.O.:1000]  Out: 1175 [Urine:1175]   Unmeasured Output  Urine Occurrence: 1  Stool Occurrence: 0       Physical Exam  Constitutional:       General: He is not in acute distress.     Appearance: Normal appearance. He is normal weight.   HENT:      Mouth/Throat:      Mouth: Mucous membranes are moist.      Pharynx: No oropharyngeal exudate.   Eyes:      General: No scleral icterus.     Extraocular Movements: Extraocular movements intact.      Pupils: Pupils are equal, round, and reactive to light.      Comments: B/l periorbital ecchymosis, swelling greatly improved  R eye injected   Neck:      Musculoskeletal: Normal range of motion and neck supple.   Cardiovascular:      Rate and Rhythm: Normal rate and regular rhythm.      Pulses: Normal pulses.      Heart sounds: No murmur.   Pulmonary:      Effort: Pulmonary effort is normal. No respiratory distress.      Breath sounds: Normal breath sounds. No wheezing.   Abdominal:      General: Abdomen is flat. Bowel sounds are normal. There is no distension.      Palpations: Abdomen is soft.      Tenderness: There is no abdominal tenderness.   Musculoskeletal: Normal range of motion.         General: No swelling or tenderness.   Skin:     General: Skin is warm and dry.      Comments: Laceration to R brow s/p 2 sutures   Neurological:      Mental Status: He is alert and oriented to person, place, and time.      GCS: GCS eye subscore is 4. GCS verbal subscore is 5. GCS motor subscore is 6.      Cranial Nerves: No cranial nerve deficit.      Motor: No weakness.   Psychiatric:         Mood and Affect: Mood normal.         Behavior: Behavior normal.         Medications:  Continuous  ScheduledlevETIRAcetam, 500 mg, BID  senna-docusate 8.6-50 mg, 1 tablet, BID    PRNacetaminophen, 650 mg, Q6H PRN  dextrose 50%, 12.5 g, PRN  glucagon (human recombinant), 1 mg, PRN  insulin aspart U-100, 1-10 Units, Q6H PRN  labetalol, 10 mg, Q6H PRN  magnesium sulfate IVPB, 2 g, PRN  magnesium sulfate IVPB, 4 g, PRN  ondansetron, 4 mg, Q6H PRN  potassium chloride in water, 40 mEq, PRN    And  potassium chloride in water, 60 mEq, PRN    And  potassium chloride in water, 80 mEq, PRN  sodium chloride 0.9%, 10 mL, PRN  sodium phosphate IVPB, 15 mmol, PRN  sodium phosphate IVPB, 20.01 mmol, PRN  sodium phosphate IVPB, 30 mmol, PRN      Today I personally reviewed pertinent medications, lines/drains/airways, imaging, laboratory results, notably:    Diet  Diet diabetic [facility]; 2000 Calorie  Diet diabetic [facility]; 2000 Calorie    Assessment/Plan:     Neuro  * Hemorrhage, subarachnoid, traumatic  Traumatic SAH after mechanical fall  -NSGY following   -Q 1 hour neuro checks   -Q 1 hour vital signs   -SBP goal <160  -6 hour repeat CTH stable   -restart DAPT  -neuro exam stable   -keppra 500 mg BID x 7 days     Cardiac/Vascular  Essential hypertension  SBP goal <160  -hold PO meds in acute setting   -continuous cardiac monitoring     Coronary artery disease involving native coronary artery of native heart without angina pectoris  Hold ASA and plavix in setting of SAH; okay to restarted 10/18 per NSGY  -EKG       Endocrine  Type 2 diabetes mellitus without complication, without long-term current use of insulin  Hold PO meds in acute setting   -SSI protocol     Orthopedic  Soft tissue injury of face  2/2 to fall   -s/p 2 sutures to right brow   -CT max/face with no evidence of fracture   -warm/cold compress to periorbital swelling   -vision intact - continue to monitor    Fall  Mechanical fall  -PT/OT eval and treat          The patient is being Prophylaxed for:  Venous Thromboembolism with:  Mechanical  Stress Ulcer with: Not Applicable   Ventilator Pneumonia with: not applicable    Activity Orders          Diet diabetic [facility]; 2000 Calorie: Diabetic starting at 10/17 0656        Full Code    Alexis Villegas MD  Neurocritical Care  [facility]

## 2020-10-19 VITALS
SYSTOLIC BLOOD PRESSURE: 124 MMHG | DIASTOLIC BLOOD PRESSURE: 60 MMHG | OXYGEN SATURATION: 97 % | WEIGHT: 184.06 LBS | BODY MASS INDEX: 25.77 KG/M2 | HEART RATE: 70 BPM | HEIGHT: 71 IN | RESPIRATION RATE: 18 BRPM | TEMPERATURE: 97 F

## 2020-10-19 LAB
ALBUMIN SERPL BCP-MCNC: 3 G/DL (ref 3.5–5.2)
ALP SERPL-CCNC: 174 U/L (ref 55–135)
ALT SERPL W/O P-5'-P-CCNC: 16 U/L (ref 10–44)
ANION GAP SERPL CALC-SCNC: 11 MMOL/L (ref 8–16)
AST SERPL-CCNC: 22 U/L (ref 10–40)
BASOPHILS # BLD AUTO: 0.04 K/UL (ref 0–0.2)
BASOPHILS NFR BLD: 0.6 % (ref 0–1.9)
BILIRUB SERPL-MCNC: 1.3 MG/DL (ref 0.1–1)
BUN SERPL-MCNC: 18 MG/DL (ref 8–23)
CALCIUM SERPL-MCNC: 8.3 MG/DL (ref 8.7–10.5)
CHLORIDE SERPL-SCNC: 103 MMOL/L (ref 95–110)
CO2 SERPL-SCNC: 22 MMOL/L (ref 23–29)
CREAT SERPL-MCNC: 1 MG/DL (ref 0.5–1.4)
DIFFERENTIAL METHOD: ABNORMAL
EOSINOPHIL # BLD AUTO: 0.4 K/UL (ref 0–0.5)
EOSINOPHIL NFR BLD: 5.1 % (ref 0–8)
ERYTHROCYTE [DISTWIDTH] IN BLOOD BY AUTOMATED COUNT: 17.1 % (ref 11.5–14.5)
EST. GFR  (AFRICAN AMERICAN): >60 ML/MIN/1.73 M^2
EST. GFR  (NON AFRICAN AMERICAN): >60 ML/MIN/1.73 M^2
GLUCOSE SERPL-MCNC: 146 MG/DL (ref 70–110)
HCT VFR BLD AUTO: 27.8 % (ref 40–54)
HGB BLD-MCNC: 8.8 G/DL (ref 14–18)
IMM GRANULOCYTES # BLD AUTO: 0.03 K/UL (ref 0–0.04)
IMM GRANULOCYTES NFR BLD AUTO: 0.4 % (ref 0–0.5)
LYMPHOCYTES # BLD AUTO: 0.7 K/UL (ref 1–4.8)
LYMPHOCYTES NFR BLD: 10.4 % (ref 18–48)
MCH RBC QN AUTO: 31.9 PG (ref 27–31)
MCHC RBC AUTO-ENTMCNC: 31.7 G/DL (ref 32–36)
MCV RBC AUTO: 101 FL (ref 82–98)
MONOCYTES # BLD AUTO: 1.3 K/UL (ref 0.3–1)
MONOCYTES NFR BLD: 18.7 % (ref 4–15)
NEUTROPHILS # BLD AUTO: 4.6 K/UL (ref 1.8–7.7)
NEUTROPHILS NFR BLD: 64.8 % (ref 38–73)
NRBC BLD-RTO: 0 /100 WBC
PLATELET # BLD AUTO: 107 K/UL (ref 150–350)
PMV BLD AUTO: 10 FL (ref 9.2–12.9)
POCT GLUCOSE: 144 MG/DL (ref 70–110)
POCT GLUCOSE: 240 MG/DL (ref 70–110)
POTASSIUM SERPL-SCNC: 4.1 MMOL/L (ref 3.5–5.1)
PROT SERPL-MCNC: 6 G/DL (ref 6–8.4)
RBC # BLD AUTO: 2.76 M/UL (ref 4.6–6.2)
SODIUM SERPL-SCNC: 136 MMOL/L (ref 136–145)
WBC # BLD AUTO: 7.1 K/UL (ref 3.9–12.7)

## 2020-10-19 PROCEDURE — 36415 COLL VENOUS BLD VENIPUNCTURE: CPT

## 2020-10-19 PROCEDURE — 25000003 PHARM REV CODE 250: Performed by: STUDENT IN AN ORGANIZED HEALTH CARE EDUCATION/TRAINING PROGRAM

## 2020-10-19 PROCEDURE — 97530 THERAPEUTIC ACTIVITIES: CPT

## 2020-10-19 PROCEDURE — 80053 COMPREHEN METABOLIC PANEL: CPT

## 2020-10-19 PROCEDURE — 99233 SBSQ HOSP IP/OBS HIGH 50: CPT | Mod: ,,, | Performed by: PHYSICIAN ASSISTANT

## 2020-10-19 PROCEDURE — 99232 SBSQ HOSP IP/OBS MODERATE 35: CPT | Mod: ,,, | Performed by: NURSE PRACTITIONER

## 2020-10-19 PROCEDURE — 92526 ORAL FUNCTION THERAPY: CPT

## 2020-10-19 PROCEDURE — 99233 PR SUBSEQUENT HOSPITAL CARE,LEVL III: ICD-10-PCS | Mod: ,,, | Performed by: PHYSICIAN ASSISTANT

## 2020-10-19 PROCEDURE — 85025 COMPLETE CBC W/AUTO DIFF WBC: CPT

## 2020-10-19 PROCEDURE — 99232 PR SUBSEQUENT HOSPITAL CARE,LEVL II: ICD-10-PCS | Mod: ,,, | Performed by: NURSE PRACTITIONER

## 2020-10-19 PROCEDURE — 97535 SELF CARE MNGMENT TRAINING: CPT

## 2020-10-19 PROCEDURE — 97110 THERAPEUTIC EXERCISES: CPT

## 2020-10-19 PROCEDURE — 92523 SPEECH SOUND LANG COMPREHEN: CPT

## 2020-10-19 RX ORDER — LEVETIRACETAM 500 MG/1
500 TABLET ORAL 2 TIMES DAILY
Qty: 10 TABLET | Refills: 0 | Status: SHIPPED | OUTPATIENT
Start: 2020-10-19 | End: 2020-10-24

## 2020-10-19 RX ORDER — NAPROXEN SODIUM 220 MG/1
81 TABLET, FILM COATED ORAL DAILY
Qty: 30 TABLET | Refills: 11 | Status: SHIPPED | OUTPATIENT
Start: 2020-10-20 | End: 2021-10-20

## 2020-10-19 RX ADMIN — ASPIRIN 81 MG CHEWABLE TABLET 81 MG: 81 TABLET CHEWABLE at 08:10

## 2020-10-19 RX ADMIN — CARVEDILOL 3.12 MG: 3.12 TABLET, FILM COATED ORAL at 08:10

## 2020-10-19 RX ADMIN — DOCUSATE SODIUM 50MG AND SENNOSIDES 8.6MG 1 TABLET: 8.6; 5 TABLET, FILM COATED ORAL at 08:10

## 2020-10-19 RX ADMIN — ATORVASTATIN CALCIUM 40 MG: 20 TABLET, FILM COATED ORAL at 08:10

## 2020-10-19 RX ADMIN — LEVETIRACETAM 500 MG: 500 TABLET ORAL at 08:10

## 2020-10-19 RX ADMIN — CLOPIDOGREL 75 MG: 75 TABLET, FILM COATED ORAL at 08:10

## 2020-10-19 NOTE — DISCHARGE SUMMARY
[facility]  Neurosurgery  Discharge Summary      Patient Name: Jayesh Astudillo  MRN: [id number]  Admission Date: 10/17/2020  Hospital Length of Stay: 2 days  Discharge Date and Time:  10/19/2020 3:31 PM  Attending Physician: Gerber Chino MD   Discharging Provider: Mariel Marc PA-C  Primary Care Provider: Patsy Thomas MD    HPI:   Pt is an 89 y.o. male with PMHx of DM, HTN, CAD with stents on ASA and plavix, cardiac pacemaker placement who presents as a transfer from OSH for traumatic SAH. Patient was at home last night when he had a mechanical fall after turning too fast on his brick floors. He hit his head on a ledge while falling. He denies LOC. He went to an OSH where CTH revealed small traumatic SAH and CT maxillofacial negative for facial fractures. Patient was transferred to [facility] for neurosurgical evaluation. GCS 15 on initial presentation.     * No surgery found *     Hospital Course: 10/17: admit to [facility] for traumatic SAH  10/18: Seen by ophtho and okay to f/u as an outpatient. Can restart ASA and Plavix per NSGY.  Vision and swelling have been improving and no fracture identified.   10/19: NAEON. AFVSS. 2L via NC weaned to off to maintain O2 sat > 92% on room air. ASA and Plavix restarted yesterday. He remains neurologically intact. He states his RLE pain and weakness has improved with therapy. PT/OT recommending IPR, however patient feels that he has progressed and wife is requesting that he be discharged home. He denies chest pain, SOB, worsening weakness, numbness, b/b dysfunction, headache, acute visual changes.    Discharge instructions were given verbally/written to the patient and to the patient's wife. All of their questions were answered. The patient was also given a discharge instruction sheet explaining the aforementioned discussion.  The patient verbalized understanding of instructions and agreed to the plan. They were encouraged to call the clinic with any questions  they might have.         Consults:   Consults (From admission, onward)        Status Ordering Provider     Inpatient consult to Neuro ICU  Once     Provider:  (Not yet assigned)    Acknowledged BA CHESTER     Inpatient consult to Neurosurgery  Once     Provider:  (Not yet assigned)    Acknowledged BA CHESTER     Inpatient consult to Ophthalmology  Once     Provider:  (Not yet assigned)    Completed TAMERA KHOURY     Inpatient consult to Physical Medicine Rehab  Once     Provider:  (Not yet assigned)    Completed YONY PATINO     Inpatient consult to Registered Dietitian/Nutritionist  Once     Provider:  (Not yet assigned)    Completed YONY PATINO     IP consult to case management/social work  Once     Provider:  (Not yet assigned)    Completed YONY PATINO          Significant Diagnostic Studies: Labs:   BMP:   Recent Labs   Lab 10/18/20  0106 10/18/20  1334 10/19/20  0345   *  --  146*     --  136   K 3.7 4.2 4.1     --  103   CO2 24  --  22*   BUN 19  --  18   CREATININE 0.9  --  1.0   CALCIUM 8.2*  --  8.3*   MG 2.1  --   --    , CBC   Recent Labs   Lab 10/18/20  0106 10/19/20  0345   WBC 7.83 7.10   HGB 8.6* 8.8*   HCT 26.4* 27.8*   * 107*    and All labs within the past 24 hours have been reviewed  Radiology:  Imaging Results           CT Head Without Contrast (Final result)  Result time 10/17/20 05:46:11    Final result by Gary Garcia MD (10/17/20 05:46:11)                 Impression:      Lobular hyperdensity overlying the left paramedian frontal convexity suggesting small volume of extra-axial hemorrhage.  Correlation with prior outside imaging to assess for stability advised.  Short-term follow-up could be performed to confirm stability.    Large right frontal scalp soft tissue hematoma with associated prominent periorbital soft tissue edema.    Chronic senescent and microvascular ischemic changes.    This report was flagged in Epic  as abnormal.    Electronically signed by resident: Nery Pratt  Date:    10/17/2020  Time:    05:12    Electronically signed by: Gary Garcia MD  Date:    10/17/2020  Time:    05:46             Narrative:    EXAMINATION:  CT HEAD WITHOUT CONTRAST    CLINICAL HISTORY:  SAH;    TECHNIQUE:  Low dose axial CT images obtained throughout the head without the use of intravenous contrast.  Axial, sagittal and coronal reconstructions were performed.    COMPARISON:  CT maxillofacial 10/17/2020    FINDINGS:  There is a globular hyperdensity overlying the left paramedian frontal convexity suggesting small volume of extra-axial hemorrhage.  No additional acute intracranial hemorrhage identified.  There is generalized cerebral volume loss with compensatory sulcal widening and ventricular enlargement.  There is mild patchy periventricular white matter hypoattenuation which is nonspecific but may relate to sequelae of chronic microvascular ischemic change.  There is no midline shift.  The basal cisterns are patent.    There is a right frontal scalp soft tissue hematoma.  There is a significant degree of right periorbital/supraorbital soft tissue edema.  The calvarium appears intact.  There is complete opacification of the right maxillary sinus as well as opacification of the right anterior ethmoid air cells and right frontal sinus.                               CT Maxillofacial Without Contrast (Final result)  Result time 10/17/20 03:20:13    Final result by Braulio Beck MD (10/17/20 03:20:13)                 Impression:      Large right frontal scalp and periorbital scalp hematoma/soft tissue contusion.  No displaced facial fracture.    Reported subarachnoid hemorrhage identified on outside hospital CT is not identified within the field of view on the current study.    Chronic appearing paranasal sinus disease with complete opacification of the right frontal and maxillary sinuses.    Additional incidental findings  discussed in the body of the report.      Electronically signed by: Braulio Beck MD  Date:    10/17/2020  Time:    03:20             Narrative:    EXAMINATION:  CT MAXILLOFACIAL WITHOUT CONTRAST    CLINICAL HISTORY:  Facial trauma; Transfer from outside hospital for subarachnoid hemorrhage.    TECHNIQUE:  Low dose axial images, sagittal and coronal reformations were obtained through the face.  Contrast was not administered.    COMPARISON:  None.    FINDINGS:  Right frontal scalp soft tissue contusion with prominent hematoma in the frontal scalp soft tissues measuring approximately 3 x 2 cm in axial dimensions.  Moderate degree of periorbital soft tissue edema extending along the right zygomatic region.  Globes are symmetric.  Retrobulbar fat is preserved.  There are postoperative changes of bilateral cataract surgery.  Small linear hyperdensity overlying the anterior aspect of the left globe likely reflects postoperative changes.  Extraocular muscles are unremarkable.    No acute facial fracture is identified.    Essentially complete opacification of the right frontal and right maxillary sinus.  Thinning of the medial wall of the left maxillary sinus could be related to aforementioned sinus disease or postoperative changes.  There is also slight thinning of the right medial orbital wall when compared to the left side.  Patchy mucosal thickening in the ethmoid air cells, right side greater than left.  Left maxillary and sphenoid sinuses are essentially clear.  The visualized mastoid air cells are essentially clear.    Advanced degenerative changes in the visualized cervical spine.  Slight anterolisthesis of C2 with respect to C3 and C3 with respect to C4.    Scattered dental caries and periapical lucencies suggestive of periodontal disease.    Limited intracranial evaluation is negative for acute finding.  Generalized cerebral volume loss with ex vacuo dilation of the ventricles and sulci.  Reported subarachnoid  hemorrhage identified on outside hospital CT is not identified within the field of view on the current study.                                  Pending Diagnostic Studies:     None        Final Active Diagnoses:    Diagnosis Date Noted POA    PRINCIPAL PROBLEM:  Hemorrhage, subarachnoid, traumatic [S06.6X9A] 10/17/2020 Yes    Subarachnoid hemorrhage [I60.9] 10/17/2020 Yes    Coronary artery disease involving native coronary artery of native heart without angina pectoris [I25.10] 10/17/2020 Yes    Essential hypertension [I10] 10/17/2020 Yes    Type 2 diabetes mellitus without complication, without long-term current use of insulin [E11.9] 10/17/2020 Yes    Fall [W19.XXXA] 10/17/2020 Yes    Soft tissue injury of face [S09.93XA] 10/17/2020 Yes      Problems Resolved During this Admission:      Discharged Condition: good    Disposition: Home or Self Care    Follow Up:  Follow-up Information     Patsy Thomas MD.    Specialty: Neurology  Why: Outpatient Services  Contact information:  [address] [facility] [address]  [phone]             Call [facility].    Specialty: Neurosurgery  Why: As needed, If symptoms worsen  Contact information:  [address]  [facility] [address] [phone]  Additional information:  7th Floor               Patient Instructions:      Ambulatory referral/consult to Home Health   Standing Status: Future   Referral Priority: Routine Referral Type: Home Health   Referral Reason: Specialty Services Required   Requested Specialty: Home Health Services   Number of Visits Requested: 1     Notify your health care provider if you experience any of the following:  temperature >100.4     Notify your health care provider if you experience any of the following:  persistent nausea and vomiting or diarrhea     Notify your health care provider if you experience any of the following:  severe uncontrolled pain     Notify your health care provider if  you experience any of the following:  redness, tenderness, or signs of infection (pain, swelling, redness, odor or green/yellow discharge around incision site)     Notify your health care provider if you experience any of the following:  difficulty breathing or increased cough     Notify your health care provider if you experience any of the following:  severe persistent headache     Notify your health care provider if you experience any of the following:  worsening rash     Notify your health care provider if you experience any of the following:  persistent dizziness, light-headedness, or visual disturbances     Notify your health care provider if you experience any of the following:  increased confusion or weakness     Activity as tolerated     Medications:  Reconciled Home Medications:      Medication List      START taking these medications    aspirin 81 MG Chew  Chew and swallow 1 tablet (81 mg total) by mouth once daily.  Start taking on: October 20, 2020     levETIRAcetam 500 MG Tab  Commonly known as: KEPPRA  Take 1 tablet (500 mg total) by mouth 2 (two) times daily. for 10 doses        CONTINUE taking these medications    ACCU-CHEK AURE PLUS METER Misc  Generic drug: blood-glucose meter  USE AS DIRECTED FOR 90 DAYS     ACCU-CHEK AURE PLUS TEST STRP Strp  Generic drug: blood sugar diagnostic  USE 1 STRIP TO CHECK GLUCOSE ONCE DAILY FOR 90 DAYS     ACCU-CHEK SOFTCLIX LANCETS Misc  Generic drug: lancets  USE AS DIRECTED ONCE DAILY FOR 30 DAYS     ascorbic acid (vitamin C) 1000 MG tablet  Commonly known as: VITAMIN C  Take 1,000 mg by mouth.     atorvastatin 40 MG tablet  Commonly known as: LIPITOR  Take 40 mg by mouth.     calcium carbonate-vitamin D3 250-125 mg 250-125 mg-unit Tab  Take 1 tablet by mouth.     carvediloL 3.125 MG tablet  Commonly known as: COREG  Take 3.125 mg by mouth.     clopidogreL 75 mg tablet  Commonly known as: PLAVIX  Take 75 mg by mouth.     furosemide 40 MG tablet  Commonly known  as: LASIX  Take 40 mg by mouth.     losartan 25 MG tablet  Commonly known as: COZAAR  TAKE 1 TABLET EVERY DAY     magnesium oxide 400 mg (241.3 mg magnesium) tablet  Commonly known as: MAG-OX  Take 400 mg by mouth.     metFORMIN 500 MG tablet  Commonly known as: GLUCOPHAGE  Take 500 mg by mouth.     potassium chloride SA 20 MEQ tablet  Commonly known as: K-DUR,KLOR-CON  Take 20 mEq by mouth.     vitamin E 400 UNIT capsule  Take 400 Units by mouth.            Mariel Marc PA-C  Neurosurgery  [facility]

## 2020-10-19 NOTE — PLAN OF CARE
10/19/20 0955   Post-Acute Status   Post-Acute Authorization Placement   Post-Acute Placement Status Referrals Sent       Recommendations are for IRF.  Referrals sent to the only two in [place] close to where the patient lives. Referrals sent to [facility] and [facility].  SW in contact with CM and Medical staff. Will continue to follow and offer support as needed.     Yeison Velarde LMSW  [facility]   Ext. [phone]

## 2020-10-19 NOTE — PLAN OF CARE
Patient to be discharged home.  The patient will have home health upon discharge with [facility].  Family provided transportation home.  Neurosurgery clinic to schedule follow up appointment.       10/19/20 1623   Final Note   Assessment Type Final Discharge Note   Anticipated Discharge Disposition Home-Health   Hospital Follow Up  Appt(s) scheduled?   (Neurosurgery clinic to schedule follow up appointment.)   Discharge plans and expectations educations in teach back method with documentation complete? Yes   Right Care Referral Info   Post Acute Recommendation Home-care   Post-Acute Status   Post-Acute Authorization Home Health   Post-Acute Placement Status Referrals Sent   Discharge Delays None known at this time

## 2020-10-19 NOTE — PT/OT/SLP EVAL
Speech Language Pathology Evaluation  Cognitive Communication  Discharge    Patient Name:  Jayesh Astudillo   MRN:  [id number]  Admitting Diagnosis: Hemorrhage, subarachnoid, traumatic    Recommendations:     Recommendations:                General Recommendations:  Follow-up not indicated  Diet recommendations:  Regular, Thin   Aspiration Precautions: Standard aspiration precautions   General Precautions: Standard, fall, seizure  Communication strategies:  none    History:     Past Medical History:   Diagnosis Date    Arrhythmia     Asthma     Atrial fibrillation     Chronic kidney disease     Coronary artery disease     Diabetes mellitus     Hyperlipidemia     Hypertension     Nocturia     Sick sinus syndrome     Tachy-alton syndrome        Past Surgical History:   Procedure Laterality Date    ANKLE SURGERY      CARDIAC PACEMAKER PLACEMENT      CHOLECYSTECTOMY      COLONOSCOPY      CORONARY ANGIOPLASTY WITH STENT PLACEMENT      CORONARY ARTERY BYPASS GRAFT      JOINT REPLACEMENT      TONSILLECTOMY         Social History: Patient lives with spouse.    Prior diet: Regular/thin.    Subjective     Awake/alert  Pain/Comfort:  · Pain Rating 1: 0/10  · Pain Rating Post-Intervention 1: 0/10    Objective:   Cognitive Status:    Orientation Oriented x4  Memory WFL  Problem Solving Conclusions 100%, Categories 100% and Compare/contrast 100%      Receptive Language:   Comprehension:   Questions Complex yes/no 100%  Commands  complex/abstract commands 100%    Pragmatics:    WFL    Expressive Language:  Verbal:    Verbal language skills were wfl with no evidence of aphasia.  Pt. Expressed their thoughts coherently in conversation with no evidence of confusion or word finding deficits  Nonverbal:   WFL      Motor Speech:  WFL    Voice:   WFL    Visual-Spatial:  WFL    Reading:   WFL       Treatment:Pt set up with breakfast during session. He tolerated thin liquids via straw x4, grits x1 and eggs x1 with timely  mastication and no overt clinical signs of airway compromise. Continue regular diet/thin liquids at this time.     Assessment:   Jayesh Astudillo is a 89 y.o. male with oropharyngeal swallow and cognitive linguistic aspects deemed WFL. No further ST warranted.     Goals:   Multidisciplinary Problems     SLP Goals     Not on file          Multidisciplinary Problems (Resolved)        Problem: SLP Goal    Goal Priority Disciplines Outcome   SLP Goal   (Resolved)     SLP Met   Description: Speech Therapy Short Term Goals  Goal expected to be met by 10/31  1. Pt will tolerate a regular diet and thin liquids with no overt s/s of aspiration.   2. Pt will participate in a speech, language, and cognitive evaluation with possible updated goals to follow pending results.                     Plan:   · Plan of Care reviewed with:  patient   · SLP Follow-Up:  No       Discharge recommendations:  Discharge Facility/Level of Care Needs: (pending [facility] [place])     Time Tracking:   SLP Treatment Date:   10/19/20  Speech Start Time:  0816  Speech Stop Time:  0826     Speech Total Time (min):  10 min    Billable Minutes: Speech Therapy Individual 5 and Treatment Swallowing Dysfunction 5    Na Sheridan CCC-SLP  10/19/2020

## 2020-10-19 NOTE — PT/OT/SLP PROGRESS
Physical Therapy Treatment    Patient Name:  Jayesh Astudillo   MRN:  [id number]    Recommendations:     Discharge Recommendations:  home health PT(with 24/hr assistance/care)   Discharge Equipment Recommendations: other (see comments)(none)   Barriers to discharge: None    Assessment:     Jayesh Astudillo is a 89 y.o. male admitted with a medical diagnosis of Hemorrhage, subarachnoid, traumatic.  He presents with the following impairments/functional limitations:  weakness, impaired endurance, impaired self care skills, impaired functional mobilty, gait instability, impaired balance, impaired cardiopulmonary response to activity, decreased lower extremity function, decreased upper extremity function, decreased safety awareness, decreased coordination. Pt's O2 saturation levels dropped to 83% on room air with ambulation, talking, and exercises seated EOB. Pt able to return to 89-90% O2 saturation with ~2 mins of pursed lip breathing. Pt ambulated 24 ft with CGA and RW. PT changed recommendation to HHPT as pt and family is wishing for HH.     Rehab Prognosis: Good; patient would benefit from acute skilled PT services to address these deficits and reach maximum level of function.    Recent Surgery: * No surgery found *      Plan:     During this hospitalization, patient to be seen 4 x/week to address the identified rehab impairments via gait training, therapeutic activities, therapeutic exercises, neuromuscular re-education and progress toward the following goals:    · Plan of Care Expires:  11/15/20    Subjective     Chief Complaint: none verbalized  Patient/Family Comments/goals: Pt eager to go home.  Pain/Comfort:  · Pain Rating 1: 0/10  · Pain Rating Post-Intervention 1: 0/10      Objective:     Communicated with RN prior to session.  Patient found seated on BSC with telemetry, peripheral IV, oxygen upon PT entry to room.     General Precautions: Standard, seizure, fall   Orthopedic Precautions:N/A   Braces: N/A     Functional  Mobility:  · Bed Mobility:     · Scooting: minimum assistance with BLE braced and VCing  · Bridging: minimum assistance with BLE bracing  · Sit to Supine: stand by assistance  · Transfers:     · Sit to Stand:  contact guard assistance with rolling walker  · SUNITA to bed: minimum assistance with  no AD and hand-held assist  using  Step Transfer  · Gait: 24 ft, CGA, RW  · Decreased gait speed, decreased step length, increased difficulty with LLE  · Balance:   · Static Sitting: [place]  · Dynamic Sitting: Supervision  · Static Standing: CGA with RW  · Dynamic Standing: CGA with RW      AM-PAC 6 CLICK MOBILITY  Turning over in bed (including adjusting bedclothes, sheets and blankets)?: 3  Sitting down on and standing up from a chair with arms (e.g., wheelchair, bedside commode, etc.): 3  Moving from lying on back to sitting on the side of the bed?: 3  Moving to and from a bed to a chair (including a wheelchair)?: 3  Need to walk in hospital room?: 3  Climbing 3-5 steps with a railing?: 2  Basic Mobility Total Score: 17       Therapeutic Activities and Exercises:  BLE, 1x10 each: LAQs, seated single leg marches, seated DF/PF  Patient educated on role of therapy, goals of session, and benefits of mobilizing.   Discussed PT plan of care during hospitalization.   Patient educated on calling for assistance.   Patient educated on how their diagnosis impacts their mobility within PT scope of practice.   Communication board up to date.  All questions answered within PT scope of practice.      Patient left HOB elevated with all lines intact, call button in reach and bed alarm on.    GOALS:   Multidisciplinary Problems     Physical Therapy Goals        Problem: Physical Therapy Goal    Goal Priority Disciplines Outcome Goal Variances Interventions   Physical Therapy Goal     PT, PT/OT Ongoing, Progressing     Description: Goals to be met by: 10/27/2020     Patient will increase functional independence with mobility by  performin. Supine to sit with Stand-by Assistance  2. Sit to stand transfer with Stand-by Assistance  3. Bed to chair transfer with Contact Guard Assistance using LRAD as needed  4. Gait  x 150 feet with Contact Guard Assistance using LRAD as needed.  5. Lower extremity exercise program x15 reps, with supervision, in order to increase LE strength and (I) with functional mobility.                       Time Tracking:     PT Received On: 10/19/20  PT Start Time: 1150     PT Stop Time: 1215  PT Total Time (min): 25 min     Billable Minutes: Therapeutic Activity 15 and Therapeutic Exercise 10    Treatment Type: Treatment  PT/PTA: PT     PTA Visit Number: 0     Carie Quijano, PT  10/19/2020

## 2020-10-19 NOTE — ASSESSMENT & PLAN NOTE
-s/p suture to R brow  -opthalmology consulted   -CT without acute fracture but with with supra and infraorbital hematoma

## 2020-10-19 NOTE — DISCHARGE INSTRUCTIONS
Traumatic Intracranial Hemorrhage Patient Information    -No driving for 1 week after your fall.  -Do not take any additional aspirin products.  -Do not take any Aleeve, Naprosyn, Naproxen, Ibuprofen, Advil or any other NSAID for 2 weeks after your fall.  -Do not take any herbal supplements for 2 weeks.   -Do not consume any alcoholic beverages until released by your Primary Care Provider.  -Do not perform any excessive bending over or leaning forward as this is a fall hazard.  -Do not perform any heavy lifting or lifting more than 10 lbs from the ground level as this is a fall hazard.    Contact the Neurosurgery Office immediately if:  -If you begin to notice any neurologic changes such as:           -Sudden onset of lethargy or sleepiness           -Sudden confusion, trouble speaking, or understanding            -Sudden trouble seeing in one or both eyes            -Sudden trouble walking, dizziness, loss of coordination            -Sudden severe headache with no known cause            -Sudden onset of numbness or weakness     Call your doctor or go to the Emergency Room for any signs of infection including: increased redness, drainage, pain or fever (temperature greater than or equal to 101.4).       Miscellaneous:  -You were started on a medication to prevent seizures (Keppra) while in the hospital. Please continue to take this medication as instructed.   -Follow up with the neurosurgery clinic as needed. Call to make an appointment using the number below.  -Please follow-up with your Primary Care provider within 2 weeks of discharge for hospital follow-up.      Neurosurgery Office: [phone]

## 2020-10-19 NOTE — NURSING
Patient is discharged with discharge instructions/personal belongings and informed to follow up PCP, and informed to contact facility with any questions or concerns.

## 2020-10-19 NOTE — ASSESSMENT & PLAN NOTE
Pt is an 89 y.o. male with PMHx of DM, HTN, CAD with stents on ASA and plavix, cardiac pacemaker placement who presents as a transfer from OSH for traumatic SAH after a fall. GCS 15 on initial presentation. Denies LOC.    - Neurologically stable  - No emergent neurosurgical intervention necessary at this time.   - Old records personally reviewed by me. All labs and imaging reviewed.  - Repeat CTH with stable right frontal SAH.  - Activity as tolerated. Progressing with PT/OT.  - GI: Diet as tolerated.  - Voiding spontaneously.  - DVT ppx: SCDs, patient ambulatory  - Continue ASA/Plavix for hx of cardiac stents  - Continue neurochecks and vital signs q4h while inpatient  - Continue keppra 500 mg bid for seizure prophylaxis  - HTN: SBP goal < 140. Continue home dose carvedilol. Resume home dose lasix and losartan at discharge. Discussed to follow-up with PCP within 2 weeks for hospital follow-up and BP management.  - DM: continue SSI while inpatient. Resume home dose metformin at discharge.    - Discussed with Dr. Chino    - Discussed discharge with patient and patient verbally understands. All questions answered. Follow-up in clinic prn for new or worsening symptoms.

## 2020-10-19 NOTE — HOSPITAL COURSE
10/17/2020: Bed mobility MaxA x 2 ppl-ModA x 2ppl.  Sit to stand [place]tw[place].  Ambulated 6 ft fwd/bwd + 2 ft lateral steps ModA x  2ppl.  Toilet Antwan and LBD TA. Passed bedside swallow evaluation.  SLP recommending regular diet and thin liquids.  No overt s/s of aspiration or difficulties with all po trials. Cog-ling eval pending.

## 2020-10-19 NOTE — ASSESSMENT & PLAN NOTE
-[facility]  -No NSGY internvetion    See hospital course for functional, cognitive/speech/language, and nutrition/swallow status.      Recommendations  -  Monitor sleep disturbances and establish consistent sleep-wake cycle  -  Environmental modifications to limit agitation/confusion   -  Reorient patient to person, place, time, and situation on each encounter  -  Avoid restraints  -  May benefit from 24/7 supervision  -  Avoid/limit medications that can worsen delirium (benzodiazepines, antihistamines, anticholinergics, hypnotics, opiates)  -  Encourage mobility, OOB in chair, and early ambulation as appropriate  -  PT/OT evaluate and treat  -  SLP speech and cognitive evaluate and treat  -  Monitor for bowel and bladder dysfunction  -  Monitor for and prevent skin breakdown and pressure ulcers  · Early mobility, repositioning/weight shifting every 20-30 minutes when sitting, turn patient every 2 hours, proper mattress/overlay and chair cushioning, pressure relief/heel protector boots

## 2020-10-19 NOTE — PT/OT/SLP PROGRESS
"Occupational Therapy   Treatment    Name: Jayesh Astudillo  MRN: [id number]  Admitting Diagnosis:  Hemorrhage, subarachnoid, traumatic       Recommendations:     Discharge Recommendations: Home health OT  Discharge Equipment Recommendations:  (TBD pending progress)  Barriers to discharge:  None    Assessment:     Jayesh Astudillo is a 89 y.o. male with a medical diagnosis of Hemorrhage, subarachnoid, traumatic.  He presents with the following performance deficits affecting function: weakness, impaired endurance, impaired self care skills, impaired functional mobilty, decreased lower extremity function, impaired balance, gait instability. Pt tolerated session well this date as noted in pt's ability to perform functional mobility with CGA and RW in preparation of completing self care in the restroom. Pt advanced to performing self care routine in standing at sink with CGA and RW. Pt presented with RLE weakness affecting his LB quality of movement during mobility. Pt demo'd good functional use of BUEs during bimanual ADL completion. OT changing disposition to home with home health and family assistance due to pt's progress and improved functional performance.     Rehab Prognosis:  Good; patient would benefit from acute skilled OT services to address these deficits and reach maximum level of function.       Plan:     Patient to be seen 4 x/week to address the above listed problems via self-care/home management, therapeutic activities, therapeutic exercises  · Plan of Care Expires: 11/15/20  · Plan of Care Reviewed with: patient    Subjective     Pain/Comfort:  · Pain Rating 1: 0/10  · Pain Rating Post-Intervention 1: 0/10     "I can feel that leg moving better." (pt's response to RLE movement)     Objective:     Communicated with: RN prior to session.  Patient found HOB elevated with telemetry, bed alarm upon OT entry to room.    General Precautions: Standard, fall, aspiration, seizure   Orthopedic Precautions:N/A   Braces: N/A "     Occupational Performance:     Bed Mobility:    · Patient completed Rolling/Turning to Right with contact guard assistance and HHA   · Patient completed Scooting/Bridging with stand by assistance for EOB anterior hip scooting to prepare for transfer   · Patient completed Supine to Sit with minimum assistance for trunk elevation to reach upright sitting   · Patient completed Sit to Supine with contact guard assistance for trunk descent onto bed      Functional Mobility/Transfers:  · Patient completed Sit <> Stand Transfer from the bed with contact guard assistance  with  rolling walker   · Cues for hand placement to initiate transfer  · Patient completed Toilet Transfer Step Transfer technique with contact guard assistance with  rolling walker  · Bedside commode utilized for seated rest break; min A required to ascend due to decreased force production with RW  · Functional Mobility: Pt ambulated household distance with CGA and RW to prepare for household mobility to complete occupations of choice.  · No LOB noted  · 1 seated RB required after standing at sink for ADL completion    Activities of Daily Living:  · Feeding:  set up A to grasp cup, bring to mouth, and drink with dominant extremity   · Min A to open apple juice due to decreased  strength   · Grooming: stand by assistance in standing to complete dental and facial hygiene with RW  · Tolerated ~2 minutes in standing   · Upper Body Dressing: minimum assistance to don/doff and manipulate gown over back seated EOB      AMPA 6 Click ADL: 19    Treatment & Education:  - Role of OT/ OT POC  - Self care safety/ independence  - Functional transfer/ mobility safety  - Bed mobility safety  - Pursed lip breathing  - Energy conservation techniques such as pacing and taking rest breaks as needed  - Progress thus far and functional performance this date   - ADL re-training   -Transfer training with rolling walker for safe functional mobility and standing ADLs at  sink    Additional staff present: Tech for transfer and mobility assistance     Patient left HOB elevated with all lines intact, call button in reach, bed alarm on and RN notifiedEducation:  ; Discussed with nsg pt's functional performance     GOALS:   Multidisciplinary Problems     Occupational Therapy Goals        Problem: Occupational Therapy Goal    Goal Priority Disciplines Outcome Interventions   Occupational Therapy Goal     OT, PT/OT Ongoing, Progressing    Description: Goals to be met by: 10/31/2020     Patient will increase functional independence with ADLs by performing:    UE Dressing with Set-up Assistance.- progressing   LE Dressing with Minimal Assistance.  Grooming while standing at sink with Stand-by Assistance.- progressing   Toileting from toilet with Stand-by Assistance for hygiene and clothing management.   Toilet transfer to toilet with Stand-by Assistance.                     Time Tracking:     OT Date of Treatment: 10/19/20  OT Start Time: 0858  OT Stop Time: 0924  OT Total Time (min): 26 min     Billable Minutes:Self Care/Home Management 13  Therapeutic Activity 13    Evelina Kessler, KALINA  10/19/2020

## 2020-10-19 NOTE — PLAN OF CARE
Problem: SLP Goal  Goal: SLP Goal  Description: Speech Therapy Short Term Goals  Goal expected to be met by 10/31  1. Pt will tolerate a regular diet and thin liquids with no overt s/s of aspiration.   2. Pt will participate in a speech, language, and cognitive evaluation with possible updated goals to follow pending results.    Outcome: Met   Speech language cognitive eval completed. No further ST warranted. Continue regular diet/thin liquids at this time.   Na Sheridan CCC-SLP  10/19/2020

## 2020-10-19 NOTE — PLAN OF CARE
CM met with patient to obtain discharge planning assessment.  Patient admitted with SAH.  Planned discharge is home with family with home health - Plan (A) or home with family - Plan (B).    PCP:  Patsy Thomas MD     Payor:  Payor: MEDICARE / Plan: MEDICARE PART A & B / Product Type: Government /      Pharmacy:    [facility] [id number] [place], [place] - [address] [facility]  [address] [place] [address]  Phone: [phone] Fax: [phone]       10/19/20 1440   Discharge Assessment   Assessment Type Discharge Planning Assessment   Confirmed/corrected address and phone number on facesheet? Yes   Assessment information obtained from? Patient   Expected Length of Stay (days) 22   Communicated expected length of stay with patient/caregiver yes   Prior to hospitilization cognitive status: Alert/Oriented   Prior to hospitalization functional status: Assistive Equipment   Current cognitive status: Alert/Oriented   Current Functional Status: Assistive Equipment   Lives With spouse   Able to Return to Prior Arrangements yes   Is patient able to care for self after discharge? Yes   Who are your caregiver(s) and their phone number(s)? Paula Astudillo - [phone]   Patient's perception of discharge disposition home health   Readmission Within the Last 30 Days no previous admission in last 30 days   Patient currently being followed by outpatient case management? No   Patient currently receives any other outside agency services? No   Equipment Currently Used at Home cane, straight   Do you have any problems affording any of your prescribed medications? No   Is the patient taking medications as prescribed? yes   Does the patient have transportation home? Yes   Transportation Anticipated family or friend will provide   Does the patient receive services at the Coumadin Clinic? No   Discharge Plan A Home with family;Home Health   Discharge Plan B Home with family   DME Needed Upon Discharge   none   Patient/Family in Agreement with Plan yes

## 2020-10-19 NOTE — PLAN OF CARE
Pt is progressing towards goals as expected. Goals remain appropriate continue with current POC.     Carie Quijano, PT, DPT  10/19/2020      Problem: Physical Therapy Goal  Goal: Physical Therapy Goal  Description: Goals to be met by: 10/27/2020     Patient will increase functional independence with mobility by performin. Supine to sit with Stand-by Assistance  2. Sit to stand transfer with Stand-by Assistance  3. Bed to chair transfer with Contact Guard Assistance using LRAD as needed  4. Gait  x 150 feet with Contact Guard Assistance using LRAD as needed.  5. Lower extremity exercise program x15 reps, with supervision, in order to increase LE strength and (I) with functional mobility.      Outcome: Ongoing, Progressing

## 2020-10-19 NOTE — HOSPITAL COURSE
10/17: admit to [facility] for traumatic SAH  10/18: Seen by karlee and estuardo to f/u as an outpatient. Can restart ASA and Plavix per NSGY.  Vision and swelling have been improving and no fracture identified.   10/19: JARVIS. AFVSS. 2L via NC weaned to off to maintain O2 sat > 92% on room air. ASA and Plavix restarted yesterday. He remains neurologically intact. He states his RLE pain and weakness has improved with therapy. PT/OT recommending IPR, however patient feels that he has progressed and wife is requesting that he be discharged home. He denies chest pain, SOB, worsening weakness, numbness, b/b dysfunction, headache, acute visual changes.    Discharge instructions were given verbally/written to the patient and to the patient's wife. All of their questions were answered. The patient was also given a discharge instruction sheet explaining the aforementioned discussion.  The patient verbalized understanding of instructions and agreed to the plan. They were encouraged to call the clinic with any questions they might have.

## 2020-10-19 NOTE — PLAN OF CARE
Problem: Fall Injury Risk  Goal: Absence of Fall and Fall-Related Injury  Outcome: Ongoing, Progressing  Intervention: Identify and Manage Contributors to Fall Injury Risk  Flowsheets (Taken 10/19/2020 0612)  Self-Care Promotion:   independence encouraged   BADL personal objects within reach  Medication Review/Management: medications reviewed  Intervention: Promote Injury-Free Environment  Flowsheets (Taken 10/19/2020 0612)  Safety Promotion/Fall Prevention:   assistive device/personal item within reach   bed alarm set   Fall Risk reviewed with patient/family   Fall Risk signage in place   side rails raised x 3   instructed to call staff for mobility  Environmental Safety Modification:   assistive device/personal items within reach   clutter free environment maintained   lighting adjusted     Problem: Adult Inpatient Plan of Care  Goal: Absence of Hospital-Acquired Illness or Injury  Outcome: Ongoing, Progressing  Intervention: Identify and Manage Fall Risk  Flowsheets (Taken 10/19/2020 0612)  Safety Promotion/Fall Prevention:   assistive device/personal item within reach   bed alarm set   Fall Risk reviewed with patient/family   Fall Risk signage in place   side rails raised x 3   instructed to call staff for mobility  Intervention: Prevent VTE (venous thromboembolism)  Flowsheets (Taken 10/19/2020 0612)  VTE Prevention/Management:   remove, assess skin and reapply sequential compression device   ROM (active) performed   dorsiflexion/plantar flexion performed  Goal: Optimal Comfort and Wellbeing  Outcome: Ongoing, Progressing  Intervention: Provide Person-Centered Care  Flowsheets (Taken 10/19/2020 0612)  Trust Relationship/Rapport:   care explained   choices provided   empathic listening provided   questions answered   emotional support provided   questions encouraged   reassurance provided   thoughts/feelings acknowledged     Problem: Adjustment to Illness (Stroke, Hemorrhagic)  Goal: Optimal Coping  Outcome:  Ongoing, Progressing  Intervention: Support Patient/Family Psychosocial Response to Stroke  Flowsheets (Taken 10/19/2020 0612)  Supportive Measures:   active listening utilized   counseling provided  Family/Support System Care: self-care encouraged     Problem: Cerebral Tissue Perfusion Risk (Stroke, Hemorrhagic)  Goal: Optimal Cerebral Tissue Perfusion  Outcome: Ongoing, Progressing  Intervention: Optimize Oxygenation and Ventilation  Flowsheets (Taken 10/19/2020 0612)  Head of Bed (HOB): HOB lowered     Problem: Eating/Swallowing Impairment (Stroke, Hemorrhagic)  Goal: Oral Intake without Aspiration  Outcome: Ongoing, Progressing  Intervention: Optimize Eating and Swallowing  Flowsheets (Taken 10/19/2020 0612)  Aspiration Precautions:   awake/alert before oral intake   upright posture maintained     Problem: Hemodynamic Instability (Stroke, Hemorrhagic)  Goal: Vital Signs Remain in Desired Range  Outcome: Ongoing, Progressing  VSS and Bg well controlled with patient receiving 1 unit of insulin at midnight.  Pt without complaint of pain and nos/s/ of distress is observed.  Pt did run a low grade temp of 100.5.  Reported to Dr. Jorge Colbert and pt medicated with PRN Tylenol.  Patient remains without injury and safety continue to be promoted with use of call bell and staff assistance for mobility.

## 2020-10-19 NOTE — PLAN OF CARE
10/19/20 1450   Post-Acute Status   Post-Acute Authorization Home Health   Post-Acute Placement Status Referrals Sent     Pt D/C home with HH. Pt previously had [facility].  Referral sent to them.  SW in contact with CM and Medical staff. Will continue to follow and offer support as needed.     Yeison Velarde, LMSW [facility]   Ext. [phone]

## 2020-10-19 NOTE — HPI
Per chart review, Jayesh Astudillo is a 89-year-old male with PMHx of DM, HTN, CAD with stents.  Patient presented to OSH s/p mechanical fall with head trauma and no LOC.  [facility]H CTH revealed small traumatic SAH and CT maxillofacial negative for facial fractures. [place] [facility] [place] for further evaluation and management.  Upon admission, NSGY consulted. C imaging revealed frontal tSAH. No acute NSGY intervention. Hospital course complicated by L eye swelling/bruising/Soft tissue injury of face (s/p 2 sutures to right brow) and fever.     Functional History: Patient lives [place] wife in [place] in a single story home with threshold  to enter.  Prior to admission, (I) with ADLs and mobility and prn use of SPC for long distances.  DME: SPC.

## 2020-10-19 NOTE — CONSULTS
[facility]  Physical Medicine & Rehab  Consult Note    Patient Name: Jayesh Astudillo  MRN: [id number]  Admission Date: 10/17/2020  Hospital Length of Stay: 2 days  Attending Physician: Ariel Felder MD     Inpatient consult to Physical Medicine & Rehabilitation  Consult performed by: Daly Giles NP  Consult requested by:  Ariel Felder MD    Reason for Consult:  assess rehabilitation needs  Consults  Subjective:     Principal Problem: Hemorrhage, subarachnoid, traumatic    HPI: Per chart review, Jayesh Astudillo is a 89-year-old male with PMHx of DM, HTN, CAD with stents.  Patient presented to OSH s/p mechanical fall with head trauma and no LOC.  OSH CTH revealed small traumatic SAH and CT maxillofacial negative for facial fractures. Transfer[place] [facility] [place] for further evaluation and management.  Upon admission, NSGY consulted. [facility] [place] imaging revealed frontal tSAH. No acute NSGY intervention. Hospital course complicated by L eye swelling/bruising/Soft tissue injury of face (s/p 2 sutures to right brow) and fever.     Functional History: Patient lives [place] wife in [place] in a single story home with threshold  to enter.  Prior to admission, (I) with ADLs and mobility and prn use of SPC for long distances.  DME: SPC.    Hospital Course: 10/17/2020: Bed mobility MaxA x 2 ppl-ModA x 2ppl.  Sit to stand Antwan.  Ambulated 6 ft fwd/bwd + 2 ft lateral steps ModA x  2ppl.  Toilet Antwan and LBD TA. Passed bedside swallow evaluation.  SLP recommending regular diet and thin liquids.  No overt s/s of aspiration or difficulties with all po trials. Cog-ling eval pending.     Past Medical History:   Diagnosis Date    Arrhythmia     Asthma     Atrial fibrillation     Chronic kidney disease     Coronary artery disease     Diabetes mellitus     Hyperlipidemia     Hypertension     Nocturia     Sick sinus syndrome     Tachy-alton syndrome      Past Surgical History:   Procedure Laterality Date    ANKLE SURGERY    "   CARDIAC PACEMAKER PLACEMENT      CHOLECYSTECTOMY      COLONOSCOPY      CORONARY ANGIOPLASTY WITH STENT PLACEMENT      CORONARY ARTERY BYPASS GRAFT      JOINT REPLACEMENT      TONSILLECTOMY       Review of patient's allergies indicates:   Allergen Reactions    Iohexol Other (See Comments)     Pt reprts possible reaction to "the cath dye". Pt reports "started shaking".        Scheduled Medications:    aspirin  81 mg Oral Daily    atorvastatin  40 mg Oral Daily    carvediloL  3.125 mg Oral BID    clopidogreL  75 mg Oral Daily    levETIRAcetam  500 mg Oral BID    senna-docusate 8.6-50 mg  1 tablet Oral BID       PRN Medications: acetaminophen, dextrose 50%, glucagon (human recombinant), insulin aspart U-100, ondansetron, sodium chloride 0.9%    Family History     Unknown per patient.        Tobacco Use    Smoking status: Former Smoker    Smokeless tobacco: Never Used   Substance and Sexual Activity    Alcohol use: Yes     Comment: couple times a week    Drug use: Never    Sexual activity: Not on file     Review of Systems   Constitutional: Positive for activity change. Negative for fatigue and fever.   HENT: Negative for trouble swallowing and voice change.    Eyes: Negative for photophobia and visual disturbance.   Respiratory: Negative for cough and shortness of breath.    Cardiovascular: Negative for chest pain and palpitations.   Gastrointestinal: Negative for nausea and vomiting.   Genitourinary: Negative for difficulty urinating and flank pain.   Musculoskeletal: Positive for gait problem. Negative for arthralgias.   Skin: Positive for color change. Negative for rash.   Neurological: Positive for weakness. Negative for facial asymmetry, speech difficulty and numbness.   Hematological: Bruises/bleeds easily.   Psychiatric/Behavioral: Negative for agitation and confusion.     Objective:     Vital Signs (Most Recent):  Temp: 98.5 °F (36.9 °C) (10/19/20 0825)  Pulse: 71 (10/19/20 0853)  Resp: 18 " (10/19/20 0825)  BP: (!) 119/56 (10/19/20 0825)  SpO2: 97 % (10/19/20 0825)    Vital Signs (24h Range):  Temp:  [98.4 °F (36.9 °C)-100.5 °F (38.1 °C)] 98.5 °F (36.9 °C)  Pulse:  [69-71] 71  Resp:  [12-29] 18  SpO2:  [94 %-100 %] 97 %  BP: (110-136)/(56-62) 119/56     Body mass index is 25.67 kg/m².    Physical Exam  Constitutional:       Appearance: He is well-developed.   HENT:      Head: Normocephalic and atraumatic.   Eyes:      General:         Right eye: No discharge.         Left eye: No discharge.   Neck:      Musculoskeletal: Neck supple.   Pulmonary:      Effort: Pulmonary effort is normal. No respiratory distress.   Abdominal:      General: There is no distension.      Palpations: Abdomen is soft.   Musculoskeletal:         General: No deformity.   Skin:     General: Skin is warm and dry.      Comments: Facial bruising  R eye abnormal  Suture in place to R brow   Neurological:      Mental Status: He is alert and oriented to person, place, and time.      Motor: No weakness.      Comments: Follows commands    Psychiatric:         Behavior: Behavior normal.         Cognition and Memory: Cognition is not impaired.       Diagnostic Results:   Labs: Reviewed  CT: Reviewed    Assessment/Plan:     * Hemorrhage, subarachnoid, traumatic  -frontal tSAH  -No NSGY internvetion    See hospital course for functional, cognitive/speech/language, and nutrition/swallow status.      Recommendations  -  Monitor sleep disturbances and establish consistent sleep-wake cycle  -  Environmental modifications to limit agitation/confusion   -  Reorient patient to person, place, time, and situation on each encounter  -  Avoid restraints  -  May benefit from 24/7 supervision  -  Avoid/limit medications that can worsen delirium (benzodiazepines, antihistamines, anticholinergics, hypnotics, opiates)  -  Encourage mobility, OOB in chair, and early ambulation as appropriate  -  PT/OT evaluate and treat  -  SLP speech and cognitive evaluate  and treat  -  Monitor for bowel and bladder dysfunction  -  Monitor for and prevent skin breakdown and pressure ulcers  · Early mobility, repositioning/weight shifting every 20-30 minutes when sitting, turn patient every 2 hours, proper mattress/overlay and chair cushioning, pressure relief/heel protector boots    Soft tissue injury of face  -s/p suture to R brow  -opthalmology consulted   -CT without acute fracture but with with supra and infraorbital hematoma      Reviewed case with Collaborative MD, Dr. Abernathy. PAC recommendation: Inpatient Rehab pending med stability. Patient wants [place] IR[place].         Thank you for your consult.     Daly Giles NP  Department of Physical Medicine & Rehab  [facility]

## 2020-10-19 NOTE — PROGRESS NOTES
[facility]  Neurosurgery  Progress Note    Subjective:     History of Present Illness: Pt is an 89 y.o. male with PMHx of DM, HTN, CAD with stents on ASA and plavix, cardiac pacemaker placement who presents as a transfer from OSH for traumatic SAH. Patient was at home last night when he had a mechanical fall after turning too fast on his brick floors. He hit his head on a ledge while falling. He denies LOC. He went to an OSH where CTH revealed small traumatic SAH and CT maxillofacial negative for facial fractures. Patient was transferred to [facility] [place] for neurosurgical evaluation. GCS 15 on initial presentation.     Post-Op Info:  * No surgery found *         Interval History: NAEON. AFVSS. 2L via NC weaned to off to maintain O2 sat > 92% on room air. ASA and Plavix restarted yesterday. He remains neurologically intact. He states his RLE pain and weakness has improved with therapy. PT/OT recommending IPR, however patient feels that he has progressed and wife is requesting that he be discharged home. He denies chest pain, SOB, worsening weakness, numbness, b/b dysfunction, headache, acute visual changes.    Medications:  Continuous Infusions:  Scheduled Meds:   aspirin  81 mg Oral Daily    atorvastatin  40 mg Oral Daily    carvediloL  3.125 mg Oral BID    clopidogreL  75 mg Oral Daily    levETIRAcetam  500 mg Oral BID    senna-docusate 8.6-50 mg  1 tablet Oral BID     PRN Meds:acetaminophen, dextrose 50%, glucagon (human recombinant), insulin aspart U-100, ondansetron, sodium chloride 0.9%       Objective:     Weight: 83.5 kg (184 lb 1.4 oz)  Body mass index is 25.67 kg/m².  Vital Signs (Most Recent):  Temp: 97 °F (36.1 °C) (10/19/20 1301)  Pulse: 70 (10/19/20 1301)  Resp: 18 (10/19/20 1301)  BP: 124/60 (10/19/20 1301)  SpO2: 97 % (10/19/20 1301) Vital Signs (24h Range):  Temp:  [97 °F (36.1 °C)-100.5 °F (38.1 °C)] 97 °F (36.1 °C)  Pulse:  [69-71] 70  Resp:  [12-29] 18  SpO2:  [95 %-100 %] 97  %  BP: (110-128)/(56-62) 124/60     Date 10/19/20 0700 - 10/20/20 0659   Shift 4699-3467 5051-0256 1672-6430 24 Hour Total   INTAKE   P.O. 580   580   Shift Total(mL/kg) 580(6.9)   580(6.9)   OUTPUT   Urine(mL/kg/hr) 275(0.4)   275   Shift Total(mL/kg) 275(3.3)   275(3.3)   Weight (kg) 83.5 83.5 83.5 83.5                 Neurosurgery Physical Exam    General: well developed, well nourished, no distress.   Head: normocephalic, bilateral periorbital ecchymosis, edema above right brow  Neck: No tracheal deviation. No palpable masses. Full ROM.   Neurologic: Alert and oriented. Thought content appropriate.  GCS: Motor: 6/Verbal: 5/Eyes: 4 GCS Total: 15  Mental Status: Awake, Alert, Oriented x 4  Language: No aphasia  Speech: No dysarthria  Cranial nerves: face symmetric, tongue midline, CN II-XII grossly intact.   Eyes: pupils equal, round, reactive to light with accomodation, EOMI.  Ears: No drainage.   Pulmonary: normal respirations, no signs of respiratory distress  Abdomen: soft, non-distended, not tender to palpation  Sensory: intact to light touch throughout  Motor Strength: Moves all extremities spontaneously with good tone. Pain with movement of proximal RLE, active movement against resistance. Full strength upper and lower extremities. No abnormal movements seen.     Strength  Deltoids Triceps Biceps Wrist Extension Wrist Flexion Hand    Upper: R 5/5 5/5 5/5 5/5 5/5 5/5    L 5/5 5/5 5/5 5/5 5/5 5/5     Iliopsoas Quadriceps Knee  Flexion Tibialis  anterior Gastro- cnemius EHL   Lower: R 5/5 5/5 5/5 5/5 5/5 5/5    L 5/5 5/5 5/5 5/5 5/5 5/5     DTR's - 2 + and symmetric in UE and LE  Pronator Drift: no drift noted  Finger-to-nose: Intact bilaterally  Clonus: absent  Babinski: absent    Laceration above right brow intact without drainage. Stable surrounding edema and ecchymosis.        Significant Labs:  Recent Labs   Lab 10/18/20  0106 10/18/20  1334 10/19/20  0345   *  --  146*     --  136   K  3.7 4.2 4.1     --  103   CO2 24  --  22*   BUN 19  --  18   CREATININE 0.9  --  1.0   CALCIUM 8.2*  --  8.3*   MG 2.1  --   --      Recent Labs   Lab 10/18/20  0106 10/19/20  0345   WBC 7.83 7.10   HGB 8.6* 8.8*   HCT 26.4* 27.8*   * 107*     No results for input(s): LABPT, INR, APTT in the last 48 hours.  Microbiology Results (last 7 days)     ** No results found for the last 168 hours. **        Recent Lab Results       10/19/20  1318   10/19/20  0535   10/19/20  0345   10/18/20  2336   10/18/20  1854        Albumin     3.0         Alkaline Phosphatase     174         ALT     16         Anion Gap     11         AST     22         Baso #     0.04         Basophil%     0.6         BILIRUBIN TOTAL     1.3  Comment:  For infants and newborns, interpretation of results should be based  on gestational age, weight and in agreement with clinical  observations.  Premature Infant recommended reference ranges:  Up to 24 hours.............<8.0 mg/dL  Up to 48 hours............<12.0 mg/dL  3-5 days..................<15.0 mg/dL  6-29 days.................<15.0 mg/dL           BUN, Bld     18         Calcium     8.3         Chloride     103         CO2     22         Creatinine     1.0         Differential Method     Automated         eGFR if      >60.0         eGFR if non      >60.0  Comment:  Calculation used to obtain the estimated glomerular filtration  rate (eGFR) is the CKD-EPI equation.            Eos #     0.4         Eosinophil%     5.1         Glucose     146         Gran # (ANC)     4.6         Gran%     64.8         Hematocrit     27.8         Hemoglobin     8.8         Immature Grans (Abs)     0.03  Comment:  Mild elevation in immature granulocytes is non specific and   can be seen in a variety of conditions including stress response,   acute inflammation, trauma and pregnancy. Correlation with other   laboratory and clinical findings is essential.           Immature  Granulocytes     0.4         Lymph #     0.7         Lymph%     10.4         MCH     31.9         MCHC     31.7         MCV     101         Mono #     1.3         Mono%     18.7         MPV     10.0         nRBC     0         Platelets     107         POCT Glucose 240 144   204 246     Potassium     4.1         PROTEIN TOTAL     6.0         RBC     2.76         RDW     17.1         Sodium     136         WBC     7.10                          10/18/20  1810        Albumin       Alkaline Phosphatase       ALT       Anion Gap       AST       Baso #       Basophil%       BILIRUBIN TOTAL       BUN, Bld       Calcium       Chloride       CO2       Creatinine       Differential Method       eGFR if        eGFR if non        Eos #       Eosinophil%       Glucose       Gran # (ANC)       Gran%       Hematocrit       Hemoglobin       Immature Grans (Abs)       Immature Granulocytes       Lymph #       Lymph%       MCH       MCHC       MCV       Mono #       Mono%       MPV       nRBC       Platelets       POCT Glucose 236     Potassium       PROTEIN TOTAL       RBC       RDW       Sodium       WBC           All pertinent labs from the last 24 hours have been reviewed.    Significant Diagnostics:  I have reviewed all pertinent imaging results/findings within the past 24 hours.    Assessment/Plan:     * Hemorrhage, subarachnoid, traumatic  Pt is an 89 y.o. male with PMHx of DM, HTN, CAD with stents on ASA and plavix, cardiac pacemaker placement who presents as a transfer from OSH for traumatic SAH after a fall. GCS 15 on initial presentation. Denies LOC.    - Neurologically stable  - No emergent neurosurgical intervention necessary at this time.   - Old records personally reviewed by me. All labs and imaging reviewed.  - Repeat CTH with stable right frontal SAH.  - Activity as tolerated. Progressing with PT/OT.  - GI: Diet as tolerated.  - Voiding spontaneously.  - DVT ppx: SCDs, patient  ambulatory  - Continue ASA/Plavix for hx of cardiac stents  - Continue neurochecks and vital signs q4h while inpatient  - Continue keppra 500 mg bid for seizure prophylaxis  - HTN: SBP goal < 140. Continue home dose carvedilol. Resume home dose lasix and losartan at discharge. Discussed to follow-up with PCP within 2 weeks for hospital follow-up and BP management.  - DM: continue SSI while inpatient. Resume home dose metformin at discharge.    - Discussed with Dr. Chino    - Discussed discharge with patient and patient verbally understands. All questions answered. Follow-up in clinic prn for new or worsening symptoms.        Mariel Marc PA-C  Neurosurgery  [facility]

## 2020-10-19 NOTE — SUBJECTIVE & OBJECTIVE
"Past Medical History:   Diagnosis Date    Arrhythmia     Asthma     Atrial fibrillation     Chronic kidney disease     Coronary artery disease     Diabetes mellitus     Hyperlipidemia     Hypertension     Nocturia     Sick sinus syndrome     Tachy-alton syndrome      Past Surgical History:   Procedure Laterality Date    ANKLE SURGERY      CARDIAC PACEMAKER PLACEMENT      CHOLECYSTECTOMY      COLONOSCOPY      CORONARY ANGIOPLASTY WITH STENT PLACEMENT      CORONARY ARTERY BYPASS GRAFT      JOINT REPLACEMENT      TONSILLECTOMY       Review of patient's allergies indicates:   Allergen Reactions    Iohexol Other (See Comments)     Pt reprts possible reaction to "the cath dye". Pt reports "started shaking".        Scheduled Medications:    aspirin  81 mg Oral Daily    atorvastatin  40 mg Oral Daily    carvediloL  3.125 mg Oral BID    clopidogreL  75 mg Oral Daily    levETIRAcetam  500 mg Oral BID    senna-docusate 8.6-50 mg  1 tablet Oral BID       PRN Medications: acetaminophen, dextrose 50%, glucagon (human recombinant), insulin aspart U-100, ondansetron, sodium chloride 0.9%    Family History     None        Tobacco Use    Smoking status: Former Smoker    Smokeless tobacco: Never Used   Substance and Sexual Activity    Alcohol use: Yes     Comment: couple times a week    Drug use: Never    Sexual activity: Not on file     Review of Systems   Constitutional: Positive for activity change. Negative for fatigue and fever.   HENT: Negative for trouble swallowing and voice change.    Eyes: Negative for photophobia and visual disturbance.   Respiratory: Negative for cough and shortness of breath.    Cardiovascular: Negative for chest pain and palpitations.   Gastrointestinal: Negative for nausea and vomiting.   Genitourinary: Negative for difficulty urinating and flank pain.   Musculoskeletal: Positive for gait problem. Negative for arthralgias.   Skin: Positive for color change. Negative for " rash.   Neurological: Positive for weakness. Negative for facial asymmetry, speech difficulty and numbness.   Hematological: Bruises/bleeds easily.   Psychiatric/Behavioral: Negative for agitation and confusion.     Objective:     Vital Signs (Most Recent):  Temp: 98.5 °F (36.9 °C) (10/19/20 0825)  Pulse: 71 (10/19/20 0853)  Resp: 18 (10/19/20 0825)  BP: (!) 119/56 (10/19/20 0825)  SpO2: 97 % (10/19/20 0825)    Vital Signs (24h Range):  Temp:  [98.4 °F (36.9 °C)-100.5 °F (38.1 °C)] 98.5 °F (36.9 °C)  Pulse:  [69-71] 71  Resp:  [12-29] 18  SpO2:  [94 %-100 %] 97 %  BP: (110-136)/(56-62) 119/56     Body mass index is 25.67 kg/m².    Physical Exam  Constitutional:       Appearance: He is well-developed.   HENT:      Head: Normocephalic and atraumatic.   Eyes:      General:         Right eye: No discharge.         Left eye: No discharge.   Neck:      Musculoskeletal: Neck supple.   Pulmonary:      Effort: Pulmonary effort is normal. No respiratory distress.   Abdominal:      General: There is no distension.      Palpations: Abdomen is soft.   Musculoskeletal:         General: No deformity.   Skin:     General: Skin is warm and dry.      Comments: Facial bruising  R eye abnormal  Suture in place to R brow   Neurological:      Mental Status: He is alert and oriented to person, place, and time.      Motor: No weakness.      Comments: Follows commands    Psychiatric:         Behavior: Behavior normal.         Cognition and Memory: Cognition is not impaired.       NEUROLOGICAL EXAMINATION:     MENTAL STATUS   Oriented to person, place, and time.       Diagnostic Results:   Labs: Reviewed  CT: Reviewed

## 2020-10-19 NOTE — PLAN OF CARE
Problem: Occupational Therapy Goal  Goal: Occupational Therapy Goal  Description: Goals to be met by: 10/31/2020     Patient will increase functional independence with ADLs by performing:    UE Dressing with Set-up Assistance.- progressing   LE Dressing with Minimal Assistance.  Grooming while standing at sink with Stand-by Assistance.- progressing   Toileting from toilet with Stand-by Assistance for hygiene and clothing management.   Toilet transfer to toilet with Stand-by Assistance.    Outcome: Ongoing, Progressing     Pt progressing well towards OT goals. OT POC remains appropriate for patient on this date. Pt will continue to benefit from skilled OT to address the deficits affecting his occupational performance.      Evelina Kessler OTR/L  10/19/20

## 2020-10-19 NOTE — SUBJECTIVE & OBJECTIVE
Interval History: NAEON. AFVSS. 2L via NC weaned to off to maintain O2 sat > 92% on room air. ASA and Plavix restarted yesterday. He remains neurologically intact. He states his RLE pain and weakness has improved with therapy. PT/OT recommending IPR, however patient feels that he has progressed and wife is requesting that he be discharged home. He denies chest pain, SOB, worsening weakness, numbness, b/b dysfunction, headache, acute visual changes.    Medications:  Continuous Infusions:  Scheduled Meds:   aspirin  81 mg Oral Daily    atorvastatin  40 mg Oral Daily    carvediloL  3.125 mg Oral BID    clopidogreL  75 mg Oral Daily    levETIRAcetam  500 mg Oral BID    senna-docusate 8.6-50 mg  1 tablet Oral BID     PRN Meds:acetaminophen, dextrose 50%, glucagon (human recombinant), insulin aspart U-100, ondansetron, sodium chloride 0.9%       Objective:     Weight: 83.5 kg (184 lb 1.4 oz)  Body mass index is 25.67 kg/m².  Vital Signs (Most Recent):  Temp: 97 °F (36.1 °C) (10/19/20 1301)  Pulse: 70 (10/19/20 1301)  Resp: 18 (10/19/20 1301)  BP: 124/60 (10/19/20 1301)  SpO2: 97 % (10/19/20 1301) Vital Signs (24h Range):  Temp:  [97 °F (36.1 °C)-100.5 °F (38.1 °C)] 97 °F (36.1 °C)  Pulse:  [69-71] 70  Resp:  [12-29] 18  SpO2:  [95 %-100 %] 97 %  BP: (110-128)/(56-62) 124/60     Date 10/19/20 0700 - 10/20/20 0659   Shift 8451-3128 1259-9334 7920-4717 24 Hour Total   INTAKE   P.O. 580   580   Shift Total(mL/kg) 580(6.9)   580(6.9)   OUTPUT   Urine(mL/kg/hr) 275(0.4)   275   Shift Total(mL/kg) 275(3.3)   275(3.3)   Weight (kg) 83.5 83.5 83.5 83.5                 Neurosurgery Physical Exam    General: well developed, well nourished, no distress.   Head: normocephalic, bilateral periorbital ecchymosis, edema above right brow  Neck: No tracheal deviation. No palpable masses. Full ROM.   Neurologic: Alert and oriented. Thought content appropriate.  GCS: Motor: 6/Verbal: 5/Eyes: 4 GCS Total: 15  Mental Status: Awake, Alert,  Oriented x 4  Language: No aphasia  Speech: No dysarthria  Cranial nerves: face symmetric, tongue midline, CN II-XII grossly intact.   Eyes: pupils equal, round, reactive to light with accomodation, EOMI.  Ears: No drainage.   Pulmonary: normal respirations, no signs of respiratory distress  Abdomen: soft, non-distended, not tender to palpation  Sensory: intact to light touch throughout  Motor Strength: Moves all extremities spontaneously with good tone. Pain with movement of proximal RLE, active movement against resistance. Full strength upper and lower extremities. No abnormal movements seen.     Strength  Deltoids Triceps Biceps Wrist Extension Wrist Flexion Hand    Upper: R 5/5 5/5 5/5 5/5 5/5 5/5    L 5/5 5/5 5/5 5/5 5/5 5/5     Iliopsoas Quadriceps Knee  Flexion Tibialis  anterior Gastro- cnemius EHL   Lower: R 5/5 5/5 5/5 5/5 5/5 5/5    L 5/5 5/5 5/5 5/5 5/5 5/5     DTR's - 2 + and symmetric in UE and LE  Pronator Drift: no drift noted  Finger-to-nose: Intact bilaterally  Clonus: absent  Babinski: absent    Laceration above right brow intact without drainage. Stable surrounding edema and ecchymosis.        Significant Labs:  Recent Labs   Lab 10/18/20  0106 10/18/20  1334 10/19/20  0345   *  --  146*     --  136   K 3.7 4.2 4.1     --  103   CO2 24  --  22*   BUN 19  --  18   CREATININE 0.9  --  1.0   CALCIUM 8.2*  --  8.3*   MG 2.1  --   --      Recent Labs   Lab 10/18/20  0106 10/19/20  0345   WBC 7.83 7.10   HGB 8.6* 8.8*   HCT 26.4* 27.8*   * 107*     No results for input(s): LABPT, INR, APTT in the last 48 hours.  Microbiology Results (last 7 days)     ** No results found for the last 168 hours. **        Recent Lab Results       10/19/20  1318   10/19/20  0535   10/19/20  0345   10/18/20  2336   10/18/20  1854        Albumin     3.0         Alkaline Phosphatase     174         ALT     16         Anion Gap     11         AST     22         Baso #     0.04         Basophil%      0.6         BILIRUBIN TOTAL     1.3  Comment:  For infants and newborns, interpretation of results should be based  on gestational age, weight and in agreement with clinical  observations.  Premature Infant recommended reference ranges:  Up to 24 hours.............<8.0 mg/dL  Up to 48 hours............<12.0 mg/dL  3-5 days..................<15.0 mg/dL  6-29 days.................<15.0 mg/dL           BUN, Bld     18         Calcium     8.3         Chloride     103         CO2     22         Creatinine     1.0         Differential Method     Automated         eGFR if      >60.0         eGFR if non      >60.0  Comment:  Calculation used to obtain the estimated glomerular filtration  rate (eGFR) is the CKD-EPI equation.            Eos #     0.4         Eosinophil%     5.1         Glucose     146         Gran # (ANC)     4.6         Gran%     64.8         Hematocrit     27.8         Hemoglobin     8.8         Immature Grans (Abs)     0.03  Comment:  Mild elevation in immature granulocytes is non specific and   can be seen in a variety of conditions including stress response,   acute inflammation, trauma and pregnancy. Correlation with other   laboratory and clinical findings is essential.           Immature Granulocytes     0.4         Lymph #     0.7         Lymph%     10.4         MCH     31.9         MCHC     31.7         MCV     101         Mono #     1.3         Mono%     18.7         MPV     10.0         nRBC     0         Platelets     107         POCT Glucose 240 144   204 246     Potassium     4.1         PROTEIN TOTAL     6.0         RBC     2.76         RDW     17.1         Sodium     136         WBC     7.10                          10/18/20  1810        Albumin       Alkaline Phosphatase       ALT       Anion Gap       AST       Baso #       Basophil%       BILIRUBIN TOTAL       BUN, Bld       Calcium       Chloride       CO2       Creatinine       Differential Method        eGFR if        eGFR if non        Eos #       Eosinophil%       Glucose       Gran # (ANC)       Gran%       Hematocrit       Hemoglobin       Immature Grans (Abs)       Immature Granulocytes       Lymph #       Lymph%       MCH       MCHC       MCV       Mono #       Mono%       MPV       nRBC       Platelets       POCT Glucose 236     Potassium       PROTEIN TOTAL       RBC       RDW       Sodium       WBC           All pertinent labs from the last 24 hours have been reviewed.    Significant Diagnostics:  I have reviewed all pertinent imaging results/findings within the past 24 hours.

## 2020-10-21 ENCOUNTER — PATIENT OUTREACH (OUTPATIENT)
Dept: ADMINISTRATIVE | Facility: CLINIC | Age: 85
End: 2020-10-21

## 2020-10-21 NOTE — PATIENT INSTRUCTIONS
Hemorrhagic Stroke: Subarachnoid Hemorrhage  A hemorrhagic stroke happens when a blood vessel in the brain ruptures or leaks. A blood vessel on the surface of the brain bursts (hemorrhages). This spills blood into the surrounding tissue. This type of stroke often happens suddenly, with little warning. It is the most serious of all types of strokes. A subarachnoid hemorrhage is a type of hemorrhagic stroke.     What happens during a subarachnoid hemorrhage?  This type of stroke happens when a major blood vessel bursts on the surface of the brain. Normally, the space between the brain and the skull is filled with a clear fluid. When the blood vessel bursts, blood flows into the space between the brain and the skull. The amount of fluid in this space increases and puts pressure on the brain. This pressure can damage nearby parts of the brain.  Most of these strokes happen when a cerebral aneurysm bursts. An aneurysm is a weak spot in the wall of a blood vessel. A bubble often forms in this weak spot. In some cases, a cerebral aneurysm causes pain or other symptoms. In most cases, though, an aneurysm causes no symptoms until it bursts.  What are the symptoms of a subarachnoid hemorrhage?  Any stroke is a medical emergency. If you have any of these symptoms, even if they seem to get better, call 911 right away:  · Sudden, excruciating headache with no known cause  · Nausea and vomiting (often with headache)  · Sudden confusion or decrease in alertness  · Trouble moving or loss of feeling, especially on the face, arm, or leg specifically on one side of the body  · Sudden trouble walking, dizziness, loss of balance or coordination  · Sudden trouble talking or understanding speech    · Sudden mood changes  · Sudden dimness, double vision, or loss of vision, particularly in one eye  · Eyes suddenly very sensitive to light  · Neck and shoulder pain or stiff neck  · Seizure  How is a hemorrhagic stroke treated?   The acute  phase lasts from the first minutes to hours after symptoms start. During this phase, treatment focuses on relieving pressure on the brain and preventing further damage. A procedure or surgery may be done to repair the ruptured (burst) aneurysm. A drain may be placed into the brain to relieve pressure. Medicines to control blood pressure may be given through an IV line. Seizure medicine is often given. Tests will likely be done to check for other aneurysms in the brain. If they are found, surgery may be done to reduce the risk that they will burst and bleed.  After the acute phase, treatment focuses on recovery. Long-term damage from a stroke can include paralysis, trouble speaking or understanding, and trouble thinking clearly. Rehabilitation therapy (rehab) can help reduce these effects and regain skills. Rehab starts in the hospital and generally continues in an inpatient or outpatient facility, and eventually at home. It will focus on regaining lost skills. It may include:  · Help regaining movement.  · Therapy for speech and language  · Help with swallowing  · Help reducing risk factors for another stroke, such as smoking  In addition, medicines that help prevent another stroke may be given. These include medicines to control blood pressure and prevent bleeding.  Date Last Reviewed: 8/26/2015  © 7172-2839 [employer]. [address] [place] [address], [place] [address]. All rights reserved. This information is not intended as a substitute for professional medical care. Always follow your healthcare professional's instructions.        Discharge Instructions for Stroke  You have been diagnosed with a stroke, or with a TIA (transient ischemic attack). Or you have been identified as having a high risk for stroke. During a stroke, blood stops flowing to part of your brain. This can damage areas in the brain that control other parts of the body. Symptoms after a stroke depend on which part of the brain has been  affected.  Stroke risk factors  Once youve had a stroke, youre at greater risk for another one. Listed below are some other factors that can increase your risk for a stroke:  · High blood pressure  · High cholesterol  · Cigarette or cigar smoking  · Diabetes  · Carotid or other artery disease  · Atrial fibrillation, atrial flutter, or other heart disease  · Not being physically active  · Obesity  · Certain blood disorders (such as sickle cell anemia)  · Excessive alcohol use  · Abuse of street drugs  · Race  · Gender  · Family history of stroke  · Diet high in salty, fried, or greasy foods  Changes in daily living  Doing your regular tasks may be difficult after youve had a stroke, but you can learn new ways to manage your daily activities. In fact, doing daily activities may help you to regain muscle strength and bring back function to affected limbs. Be patient, give yourself time to adjust, and appreciate the progress you make.  Daily activities  You may be at risk of falling. Make changes to your home to help you walk more easily. A therapist will decide if you need an assistive device to walk safely.  You may need to see an occupational therapist or physical therapist to learn new ways of doing things. For example, you may need to make adjustments when bathing or dressing:  Tips for showering or bathing  · Test the water temperature with a hand or foot that was not affected by the stroke.  · Use grab bars, a shower seat, a hand-held showerhead, and a long-handled brush.  Tips for getting dressed  · Dress while sitting, starting with the affected side or limb.  · Wear shirts that pull easily over your head. Wear pants or skirts with elastic waistbands.  · Use zippers with loops attached to the pull tabs.  Lifestyle changes  · Take your medicines exactly as directed. Dont skip doses.  · Begin an exercise program. Ask your provider how to get started. Also ask how much activity you should try to get on a daily  or weekly basis. You can benefit from simple activities such as walking or gardening.  · Limit alcohol intake. Men should have no more than 2 alcoholic drinks a day. Women should limit themselves to 1 alcoholic drink per day.  · Know your cholesterol level. Follow your providers recommendations about how to keep cholesterol under control.  · If you are a smoker, quit now. Join a stop-smoking program to improve your chances of success. Ask your provider about medicines or other methods to help you quit.  · Learn stress management techniques to help you deal with stress in your home and work life.  Diet  Your healthcare provider will give you information on dietary changes that you may need to make, based on your situation. Your provider may recommend that you see a registered dietitian for help with diet changes. Changes may include:  · Reducing fat and cholesterol intake  · Reducing salt (sodium) intake, especially if you have high blood pressure  · Eating more fresh vegetables and fruits  · Eating more lean proteins, such as fish, poultry, and beans and peas (legumes)  · Eating less red meat and processed meats  · Using low-fat dairy products  · Limiting vegetable oils and nut oils  · Limiting sweets and processed foods such as chips, cookies, and baked goods  Follow-up care  · Keep your medical appointments. Close follow-up is important to stroke rehabilitation and recovery.  · Some medicines require blood tests to check for progress or problems. Keep follow-up appointments for any blood tests ordered by your providers.  When to call 911  Call 911 right away if you have any of the following symptoms of stroke:  · Weakness, tingling, or loss of feeling on one side of your face or body  · Sudden double vision or trouble seeing in one or both eyes  · Sudden trouble talking or slurred speech  · Trouble understanding others  · Sudden, severe headache  · Dizziness, loss of balance, or a sense of falling  · Blackouts or  seizures      F.A.S.T. is an easy way to remember the signs of stroke. When you see these signs, you know that you need to call 911 fast.  F.A.S.T. stands for:  · F is for face drooping. One side of the face is drooping or numb. When the person smiles, the smile is uneven.  · A is for arm weakness. One arm is weak or numb. When the person lifts both arms at the same time, one arm may drift downward.  · S is for speech difficulty. You may notice slurred speech or trouble speaking. The person can't repeat a simple sentence correctly when asked.  · T is for time to call 911. If someone shows any of these symptoms, even if they go away, call 911 immediately. Make note of the time the symptoms first appeared.  Date Last Reviewed: 8/26/2015 © 2000-2017 [employer]. [address] [place] [address], [place] [address]. All rights reserved. This information is not intended as a substitute for professional medical care. Always follow your healthcare professional's instructions.